# Patient Record
Sex: FEMALE | Race: WHITE | HISPANIC OR LATINO | Employment: UNEMPLOYED | URBAN - METROPOLITAN AREA
[De-identification: names, ages, dates, MRNs, and addresses within clinical notes are randomized per-mention and may not be internally consistent; named-entity substitution may affect disease eponyms.]

---

## 2019-11-27 ENCOUNTER — OFFICE VISIT (OUTPATIENT)
Dept: FAMILY MEDICINE | Facility: CLINIC | Age: 29
End: 2019-11-27

## 2019-11-27 VITALS
DIASTOLIC BLOOD PRESSURE: 70 MMHG | BODY MASS INDEX: 30.09 KG/M2 | SYSTOLIC BLOOD PRESSURE: 110 MMHG | HEIGHT: 63 IN | RESPIRATION RATE: 16 BRPM | OXYGEN SATURATION: 98 % | TEMPERATURE: 98.3 F | HEART RATE: 63 BPM | WEIGHT: 169.8 LBS

## 2019-11-27 DIAGNOSIS — F19.21 DRUG ADDICTION IN REMISSION (H): ICD-10-CM

## 2019-11-27 DIAGNOSIS — R07.0 THROAT PAIN: Primary | ICD-10-CM

## 2019-11-27 DIAGNOSIS — K13.0 MUCOCELE OF LIP: ICD-10-CM

## 2019-11-27 DIAGNOSIS — J06.9 UPPER RESPIRATORY TRACT INFECTION, UNSPECIFIED TYPE: ICD-10-CM

## 2019-11-27 DIAGNOSIS — Z71.89 ACP (ADVANCE CARE PLANNING): ICD-10-CM

## 2019-11-27 DIAGNOSIS — Z76.89 HEALTH CARE HOME: ICD-10-CM

## 2019-11-27 LAB — S PYO AG THROAT QL IA.RAPID: NORMAL

## 2019-11-27 PROCEDURE — 87070 CULTURE OTHR SPECIMN AEROBIC: CPT | Performed by: FAMILY MEDICINE

## 2019-11-27 PROCEDURE — 87880 STREP A ASSAY W/OPTIC: CPT | Performed by: FAMILY MEDICINE

## 2019-11-27 PROCEDURE — 99204 OFFICE O/P NEW MOD 45 MIN: CPT | Performed by: FAMILY MEDICINE

## 2019-11-27 SDOH — HEALTH STABILITY: MENTAL HEALTH: HOW OFTEN DO YOU HAVE A DRINK CONTAINING ALCOHOL?: NEVER

## 2019-11-27 ASSESSMENT — MIFFLIN-ST. JEOR: SCORE: 1475.37

## 2019-11-27 NOTE — LETTER
[facility]  [address] [place]  [address]  [facility] [address]  [phone]      November 27, 2019      Cody Pelaez  [address]  [facility] [address]      EMERGENCY CARE PLAN  Presenting Problem Treatment Plan   Questions or concerns during clinic hours I will call the clinic directly:    [facility]  [address] , [address]  [place] [address]  [phone]   Questions or concerns outside clinic hours  I will call the 24 hour line at [phone]   Patient needs to schedule an appointment  I will call the  scheduling line at [phone]   Same day treatment   I will call the clinic first, then  urgent care and/or  express care if needed   Clinic Care Coordinators Natalya Singh RN:  [phone]  [facility] Clinical Support Staff: [phone]    Crisis Services:  Behavioral or Mental Health P (Behavioral Health Providers)   [phone]   Emergency treatment--Immediately CALL [phone]

## 2019-11-27 NOTE — PATIENT INSTRUCTIONS
Follow handout    Monitor your lip mucocele  If not resolving on its own, ENT consult for removal    Symptomatic care with decongestants, fluids, tylenol/advil prn. Use GUAIFENESIN  MG OR TBCR, 1 tab po BID (Twice per day), D: 20, R: 0 for congestion and cough.    In addition, I have suggested that the patient   monitor for symptoms of bacterial infection expecting slow gradual resolution of viral URI as the natural course.

## 2019-11-27 NOTE — PROGRESS NOTES
SUBJECTIVE: 28 year old female complaining of sore throat for 3 day(s).   The patient describes nasal congestion as well. SO has a daughter who visited with a cold  The patient denies a history of fever, GI symptoms or rash.   Smoking history: No.   Relevant past medical history: positive for lump on her lip after trauma from new braces/ hardware caught her lip. healing slowly.     ROS: 10 point ROS neg other than the symptoms noted above in the HPI.      OBJECTIVE: The patient appears healthy, alert, no distress, cooperative and smiling.   EARS: negative  NOSE/SINUS: positive findings: mucosa erythematous and swollen, clear rhinorrhea   THROAT: erythematous, post nasal drainage and right upper lip healing mucocele/ braces and lower lip piercing with metal touching this area     RSS negative    NECK:Neck supple. No adenopathy. Thyroid symmetric, normal size,, Carotids without bruits.   CHEST: Regular rate and  rhythm. S1 and S2 normal, no murmurs, clicks, gallops or rubs. No edema or JVD. Chest is clear; no wheezes or rales.  ABD: The abdomen is soft without tenderness, guarding, mass or organomegaly. Bowel sounds are normal. No CVA tenderness or inguinal adenopathy noted.      ASSESSMENT:(R07.0) Throat pain  (primary encounter diagnosis)  Comment: symptomatic care/ await culture  Plan: Rapid strep screen, THROAT CULTURE (BFP)        Symptomatic care with decongestants, fluids, tylenol/advil prn. Use GUAIFENESIN  MG OR TBCR, 1 tab po BID (Twice per day), D: 20, R: 0 for congestion and cough.    In addition, I have suggested that the patient   monitor for symptoms of bacterial infection expecting slow gradual resolution of viral URI as the natural course.      (J06.9) Upper respiratory tract infection, unspecified type  Plan: as above    (K13.0) Mucocele of lip  Comment: remove piercing that touches this area  Plan: OTOLARYNGOLOGY REFERRAL        Friction and trauma/ can heal. Protect. Schedule ENT for any  concerns    (F19.21) Drug addiction in remission (H)  Plan: Support    (Z76.89) Health Care Home  Plan: I have reviewed the patient's medical history in detail and updated the computerized patient record.      (Z71.89) ACP (advance care planning)  Plan: I have reviewed the patient's medical history in detail and updated the computerized patient record.

## 2019-11-27 NOTE — NURSING NOTE
Cody is here for sore throat    Pre-visit Screening:  Immunizations:  up to date  Colonoscopy:  NA  Mammogram: NA  Asthma Action Test/Plan:  NA  PHQ9:  None  GAD7:  None  Questioned patient about current smoking habits Pt. recently quit smoking.  Ok to leave detailed message on voice mail for today's visit only Yes, phone #[phone]

## 2019-11-29 LAB — THROAT CULTURE: NORMAL

## 2020-01-20 ENCOUNTER — OFFICE VISIT (OUTPATIENT)
Dept: FAMILY MEDICINE | Facility: CLINIC | Age: 30
End: 2020-01-20

## 2020-01-20 VITALS
DIASTOLIC BLOOD PRESSURE: 62 MMHG | SYSTOLIC BLOOD PRESSURE: 106 MMHG | HEART RATE: 64 BPM | WEIGHT: 177.2 LBS | TEMPERATURE: 98.2 F | HEIGHT: 64 IN | RESPIRATION RATE: 20 BRPM | BODY MASS INDEX: 30.25 KG/M2

## 2020-01-20 DIAGNOSIS — Z13.220 SCREENING CHOLESTEROL LEVEL: ICD-10-CM

## 2020-01-20 DIAGNOSIS — Z00.01 ENCOUNTER FOR GENERAL ADULT MEDICAL EXAMINATION WITH ABNORMAL FINDINGS: Primary | ICD-10-CM

## 2020-01-20 DIAGNOSIS — K59.09 OTHER CONSTIPATION: ICD-10-CM

## 2020-01-20 DIAGNOSIS — Z83.49 FAMILY HISTORY OF THYROID DISORDER: ICD-10-CM

## 2020-01-20 DIAGNOSIS — F19.21 DRUG ADDICTION IN REMISSION (H): ICD-10-CM

## 2020-01-20 DIAGNOSIS — F31.30 BIPOLAR I DISORDER, MOST RECENT EPISODE DEPRESSED (H): ICD-10-CM

## 2020-01-20 DIAGNOSIS — Z13.1 SCREENING FOR DIABETES MELLITUS: ICD-10-CM

## 2020-01-20 LAB
CHOLEST SERPL-MCNC: 182 MG/DL (ref 0–199)
CHOLEST/HDLC SERPL: 3 {RATIO} (ref 0–5)
GLUCOSE SERPL-MCNC: 90 MG/DL (ref 60–99)
HDLC SERPL-MCNC: 60 MG/DL (ref 40–150)
HEMOGLOBIN: 12.4 G/DL (ref 11.7–15.7)
LDLC SERPL CALC-MCNC: 110 MG/DL (ref 0–130)
TRIGL SERPL-MCNC: 59 MG/DL (ref 0–149)

## 2020-01-20 PROCEDURE — 80061 LIPID PANEL: CPT | Performed by: FAMILY MEDICINE

## 2020-01-20 PROCEDURE — 90471 IMMUNIZATION ADMIN: CPT | Performed by: FAMILY MEDICINE

## 2020-01-20 PROCEDURE — 90715 TDAP VACCINE 7 YRS/> IM: CPT | Performed by: FAMILY MEDICINE

## 2020-01-20 PROCEDURE — 36415 COLL VENOUS BLD VENIPUNCTURE: CPT | Performed by: FAMILY MEDICINE

## 2020-01-20 PROCEDURE — 99395 PREV VISIT EST AGE 18-39: CPT | Mod: 25 | Performed by: FAMILY MEDICINE

## 2020-01-20 PROCEDURE — 82947 ASSAY GLUCOSE BLOOD QUANT: CPT | Performed by: FAMILY MEDICINE

## 2020-01-20 PROCEDURE — 85018 HEMOGLOBIN: CPT | Performed by: FAMILY MEDICINE

## 2020-01-20 SDOH — ECONOMIC STABILITY: INCOME INSECURITY: HOW HARD IS IT FOR YOU TO PAY FOR THE VERY BASICS LIKE FOOD, HOUSING, MEDICAL CARE, AND HEATING?: SOMEWHAT HARD

## 2020-01-20 SDOH — ECONOMIC STABILITY: FOOD INSECURITY: WITHIN THE PAST 12 MONTHS, YOU WORRIED THAT YOUR FOOD WOULD RUN OUT BEFORE YOU GOT MONEY TO BUY MORE.: NEVER TRUE

## 2020-01-20 SDOH — ECONOMIC STABILITY: FOOD INSECURITY: WITHIN THE PAST 12 MONTHS, THE FOOD YOU BOUGHT JUST DIDN'T LAST AND YOU DIDN'T HAVE MONEY TO GET MORE.: NEVER TRUE

## 2020-01-20 SDOH — ECONOMIC STABILITY: TRANSPORTATION INSECURITY
IN THE PAST 12 MONTHS, HAS THE LACK OF TRANSPORTATION KEPT YOU FROM MEDICAL APPOINTMENTS OR FROM GETTING MEDICATIONS?: NO

## 2020-01-20 SDOH — ECONOMIC STABILITY: TRANSPORTATION INSECURITY
IN THE PAST 12 MONTHS, HAS LACK OF TRANSPORTATION KEPT YOU FROM MEETINGS, WORK, OR FROM GETTING THINGS NEEDED FOR DAILY LIVING?: NO

## 2020-01-20 ASSESSMENT — MIFFLIN-ST. JEOR: SCORE: 1515.83

## 2020-01-20 NOTE — LETTER
January 21, 2020      Cody Pelaez  [address]  [place] [address]        Dear ,    We are writing to inform you of your test results.    Your test results fall within the expected range(s) including thyroid hormone level (TSH).   Please continue with current treatment plan.    Resulted Orders   CL AFF HEMOGLOBIN (BFP)   Result Value Ref Range    Hemoglobin 12.4 11.7 - 15.7 g/dL   Glucose Fasting (BFP)   Result Value Ref Range    Glucose 90 60 - 99 mg/dL   TSH with free T4 reflex ([facility])   Result Value Ref Range    TSH 1.51 mIU/L      Comment:                Reference Range                         > or = 20 Years  0.40-4.50                              Pregnancy Ranges            First trimester    0.26-2.66            Second trimester   0.55-2.73            Third trimester    0.43-2.91     Lipid Panel (BFP)   Result Value Ref Range    Cholesterol 182 0 - 199 mg/dL    Triglycerides 59 0 - 149 mg/dL    HDL Cholesterol 60 40 - 150 mg/dL    LDL Cholesterol Direct 110 0 - 130 mg/dL    Cholesterol/HDL Ratio 3 0 - 5       If you have any questions or concerns, please call the clinic at the number listed above.       Sincerely,        Jess Carmichael MD

## 2020-01-20 NOTE — PATIENT INSTRUCTIONS
Await labs    Exercise encouraged  Fasting lipid profile obtained  Follow up in 1 year  Glucose obtained  Hemoglobin obtained  High fiber, low fat diet encouraged  Routine breast self-exam encouraged  Seat belt use encouraged  Sunscreen recommended  TSH obtained  Weight loss recommended    Read thyroid handout

## 2020-01-20 NOTE — NURSING NOTE
Cody Pelaez is here for a CPX.    Pre-visit planning  Immunizations -up to date  Colonoscopy -  Mammogram -  Asthma test --  PHQ9 -  LOUISE 7 -    Questioned patient about current smoking habits.  Pt. quit smoking some time ago.  Body mass index is 30.9 kg/m .  PULSE regular  My Chart:   CLASSIFICATION OF OVERWEIGHT AND OBESITY BY BMI                        Obesity Class           BMI(kg/m2)  Underweight                                    < 18.5  Normal                                         18.5-24.9  Overweight                                     25.0-29.9  OBESITY                     I                  30.0-34.9                             II                 35.0-39.9  EXTREME OBESITY             III                >40                            Patient's  BMI Body mass index is 30.9 kg/m .  Http://hin.nhlbi.nih.gov/menuplanner/menu.cgi

## 2020-01-21 LAB — TSH SERPL-ACNC: 1.51 MIU/L

## 2022-05-29 NOTE — PROGRESS NOTES
"SUBJECTIVE:  Cody Pelaez is an 29 year old G 3 P 0 woman who presents for   annual gyn exam. No LMP recorded. Periods are irregular every 2-4 months lasting   3 days. Dysmenorrhea:none. Cyclic symptoms   include none. No intermenstrual bleeding,   spotting, or discharge.    Current contraception: vasectomy  ALETHEA exposure: no  History of abnormal Pap smear: No  Family history of uterine or ovarian cancer: No  Regular self breast exam: No  History of abnormal mammogram: No  Family history of breast cancer: No  History of abnormal lipids: No    Past Medical History:   Diagnosis Date     Bipolar I disorder, most recent episode depressed (H) 1/20/2020     Drug addiction in remission (H) 11/27/2019     Mucocele of lip 11/27/2019     Other constipation 1/20/2020       Family History   Problem Relation Age of Onset     Hyperlipidemia Mother      Thyroid Disease Mother      Diabetes Type 2  Maternal Grandmother        Past Surgical History:   Procedure Laterality Date     NO HISTORY OF SURGERY         No current outpatient medications on file.     No current facility-administered medications for this visit.      No Known Allergies    Social History     Tobacco Use     Smoking status: Former Smoker     Packs/day: 1.00     Years: 2.00     Pack years: 2.00     Smokeless tobacco: Never Used   Substance Use Topics     Alcohol use: Never     Frequency: Never       Review Of Systems  Ears/Nose/Throat: negative/ see last visit and ENT consultation  Respiratory: No shortness of breath, dyspnea on exertion, cough, or hemoptysis  Cardiovascular: negative  Gastrointestinal: hemorrhoids /constipation  Genitourinary: negative  Mental health: admits to bipolar illness seeing a provider in [place]/ drug addiction issues    OBJECTIVE:  /62 (BP Location: Left arm, Patient Position: Chair, Cuff Size: Adult Regular)   Pulse 64   Temp 98.2  F (36.8  C) (Oral)   Resp 20   Ht 1.613 m (5' 3.5\")   Wt 80.4 kg (177 lb 3.2 oz)   LMP " 12/12/2019   Breastfeeding No   BMI 30.90 kg/m    General appearance: healthy, alert, no distress, cooperative and fatigued  Skin: Skin color, texture, turgor normal. No rashes or lesions.  Ears: negative  Nose/Sinuses: Nares normal. Septum midline. Mucosa normal. No drainage or sinus tenderness.  Oropharynx: Lips, mucosa, and tongue normal. Teeth and gums normal.  Neck: Neck supple. No adenopathy. Thyroid symmetric, normal size,, Carotids without bruits.  Lungs: negative, Percussion normal. Good diaphragmatic excursion. Lungs clear  Heart: negative, PMI normal. No lifts, heaves, or thrills. RRR. No murmurs, clicks gallops or rub  Breasts: Inspection negative. No nipple discharge or bleeding. No masses.  Abdomen: Abdomen soft, non-tender. BS normal. No masses, organomegaly  Pelvic: External genitalia and vagina normal. Bimanual and rectovaginal normal.  BMI : Body mass index is 30.9 kg/m .    ASSESSMENT:  (Z00.01) Encounter for general adult medical examination with abnormal findings  (primary encounter diagnosis)  Plan: CL AFF HEMOGLOBIN (BFP), VENOUS COLLECTION        Await labs    Exercise encouraged  Fasting lipid profile obtained  Follow up in 1 year  Glucose obtained  Hemoglobin obtained  High fiber, low fat diet encouraged  Routine breast self-exam encouraged  Seat belt use encouraged  Sunscreen recommended  TSH obtained  Weight loss recommended      (F31.30) Bipolar I disorder, most recent episode depressed (H)  Plan: TSH with free T4 reflex ([facility]), VENOUS         COLLECTION        I have reviewed the patient's medical history in detail and updated the computerized patient record.  Support    (F19.21) Drug addiction in remission (H)  Plan: I have reviewed the patient's medical history in detail and updated the computerized patient record.      (Z83.49) Family history of thyroid disorder  Plan: TSH with free T4 reflex ([facility]), VENOUS         COLLECTION            (K59.09) Other constipation  Plan: TSH with  free T4 reflex ([facility]), VENOUS         COLLECTION        High fiber reviewed    (Z13.1) Screening for diabetes mellitus  Plan: Glucose Fasting (BFP), VENOUS COLLECTION            (Z13.220) Screening cholesterol level  Plan: Lipid Panel (BFP), VENOUS COLLECTION              Dx:  1)  Pap smear  2)  Mammography, lipids at appropriate intervals    PE:  Reviewed health maintenance including diet, regular exercise   and periodic exams.   Bilateral hydronephrosis

## 2022-08-10 ENCOUNTER — OFFICE VISIT (OUTPATIENT)
Dept: FAMILY MEDICINE | Facility: CLINIC | Age: 32
End: 2022-08-10
Payer: COMMERCIAL

## 2022-08-10 ENCOUNTER — TELEPHONE (OUTPATIENT)
Dept: FAMILY MEDICINE | Facility: CLINIC | Age: 32
End: 2022-08-10

## 2022-08-10 VITALS
SYSTOLIC BLOOD PRESSURE: 104 MMHG | HEART RATE: 66 BPM | OXYGEN SATURATION: 99 % | HEIGHT: 64 IN | RESPIRATION RATE: 20 BRPM | WEIGHT: 169.3 LBS | DIASTOLIC BLOOD PRESSURE: 71 MMHG | BODY MASS INDEX: 28.9 KG/M2 | TEMPERATURE: 97.3 F

## 2022-08-10 DIAGNOSIS — F31.30 BIPOLAR I DISORDER, MOST RECENT EPISODE DEPRESSED (H): ICD-10-CM

## 2022-08-10 DIAGNOSIS — Z13.1 SCREENING FOR DIABETES MELLITUS: ICD-10-CM

## 2022-08-10 DIAGNOSIS — Z12.4 CERVICAL CANCER SCREENING: ICD-10-CM

## 2022-08-10 DIAGNOSIS — E66.3 OVERWEIGHT (BMI 25.0-29.9): ICD-10-CM

## 2022-08-10 DIAGNOSIS — Z11.59 NEED FOR HEPATITIS C SCREENING TEST: ICD-10-CM

## 2022-08-10 DIAGNOSIS — F19.21 DRUG ADDICTION IN REMISSION (H): ICD-10-CM

## 2022-08-10 DIAGNOSIS — Z00.00 ROUTINE GENERAL MEDICAL EXAMINATION AT A HEALTH CARE FACILITY: Primary | ICD-10-CM

## 2022-08-10 DIAGNOSIS — Z01.83 BLOOD TYPING ENCOUNTER: Primary | ICD-10-CM

## 2022-08-10 DIAGNOSIS — Z83.49 FAMILY HISTORY OF THYROID DISEASE: ICD-10-CM

## 2022-08-10 DIAGNOSIS — H93.12 TINNITUS, LEFT: ICD-10-CM

## 2022-08-10 DIAGNOSIS — Z78.9 HEPATITIS B VACCINATION STATUS UNKNOWN: ICD-10-CM

## 2022-08-10 DIAGNOSIS — Z11.4 SCREENING FOR HIV (HUMAN IMMUNODEFICIENCY VIRUS): ICD-10-CM

## 2022-08-10 PROBLEM — K59.09 OTHER CONSTIPATION: Status: RESOLVED | Noted: 2020-01-20 | Resolved: 2022-08-10

## 2022-08-10 PROBLEM — Z71.89 ACP (ADVANCE CARE PLANNING): Status: RESOLVED | Noted: 2019-11-27 | Resolved: 2022-08-10

## 2022-08-10 PROBLEM — Z76.89 HEALTH CARE HOME: Status: RESOLVED | Noted: 2019-11-27 | Resolved: 2022-08-10

## 2022-08-10 PROBLEM — K13.0 MUCOCELE OF LIP: Status: RESOLVED | Noted: 2019-11-27 | Resolved: 2022-08-10

## 2022-08-10 PROCEDURE — 87340 HEPATITIS B SURFACE AG IA: CPT | Performed by: PHYSICIAN ASSISTANT

## 2022-08-10 PROCEDURE — G0145 SCR C/V CYTO,THINLAYER,RESCR: HCPCS | Performed by: PHYSICIAN ASSISTANT

## 2022-08-10 PROCEDURE — 99395 PREV VISIT EST AGE 18-39: CPT | Performed by: PHYSICIAN ASSISTANT

## 2022-08-10 PROCEDURE — 86704 HEP B CORE ANTIBODY TOTAL: CPT | Performed by: PHYSICIAN ASSISTANT

## 2022-08-10 PROCEDURE — 86706 HEP B SURFACE ANTIBODY: CPT | Performed by: PHYSICIAN ASSISTANT

## 2022-08-10 PROCEDURE — 36415 COLL VENOUS BLD VENIPUNCTURE: CPT | Performed by: PHYSICIAN ASSISTANT

## 2022-08-10 PROCEDURE — 86803 HEPATITIS C AB TEST: CPT | Performed by: PHYSICIAN ASSISTANT

## 2022-08-10 PROCEDURE — 87389 HIV-1 AG W/HIV-1&-2 AB AG IA: CPT | Performed by: PHYSICIAN ASSISTANT

## 2022-08-10 PROCEDURE — 80061 LIPID PANEL: CPT | Performed by: PHYSICIAN ASSISTANT

## 2022-08-10 PROCEDURE — 80048 BASIC METABOLIC PNL TOTAL CA: CPT | Performed by: PHYSICIAN ASSISTANT

## 2022-08-10 PROCEDURE — 84443 ASSAY THYROID STIM HORMONE: CPT | Performed by: PHYSICIAN ASSISTANT

## 2022-08-10 RX ORDER — TRAZODONE HYDROCHLORIDE 50 MG/1
TABLET, FILM COATED ORAL
COMMUNITY
Start: 2022-07-18

## 2022-08-10 ASSESSMENT — ENCOUNTER SYMPTOMS
ARTHRALGIAS: 1
WEAKNESS: 0
CHILLS: 0
HEADACHES: 1
NAUSEA: 0
HEMATURIA: 0
EYE PAIN: 0
DIARRHEA: 0
JOINT SWELLING: 0
HEARTBURN: 0
ABDOMINAL PAIN: 0
NERVOUS/ANXIOUS: 1
MYALGIAS: 0
DYSURIA: 0
HEMATOCHEZIA: 0
SHORTNESS OF BREATH: 0
PALPITATIONS: 0
PARESTHESIAS: 0
COUGH: 0
FEVER: 0
SORE THROAT: 0
CONSTIPATION: 0
FREQUENCY: 0
DIZZINESS: 0

## 2022-08-10 ASSESSMENT — PATIENT HEALTH QUESTIONNAIRE - PHQ9
SUM OF ALL RESPONSES TO PHQ QUESTIONS 1-9: 13
SUM OF ALL RESPONSES TO PHQ QUESTIONS 1-9: 13
10. IF YOU CHECKED OFF ANY PROBLEMS, HOW DIFFICULT HAVE THESE PROBLEMS MADE IT FOR YOU TO DO YOUR WORK, TAKE CARE OF THINGS AT HOME, OR GET ALONG WITH OTHER PEOPLE: NOT DIFFICULT AT ALL

## 2022-08-10 NOTE — PROGRESS NOTES
"   SUBJECTIVE:   CC: Cody Pelaez is an 31 year old woman who presents for preventive health visit.     Patient has been advised of split billing requirements and indicates understanding: Yes  Healthy Habits:     Getting at least 3 servings of Calcium per day:  Yes    Bi-annual eye exam:  NO    Dental care twice a year:  Yes    Sleep apnea or symptoms of sleep apnea:  Excessive snoring    Diet:  Regular (no restrictions)    Frequency of exercise:  2-3 days/week    Duration of exercise:  N/A    Taking medications regularly:  Yes    Medication side effects:  None    PHQ-2 Total Score: 4    Additional concerns today:  No    History of bipolar I disorder - follows with psychiatry. Has been on medication in the past but not currently treated with meds. Sees therapist weekly. Takes trazodone for sleep.     Lives with her boyfriend and his 12 year old son. He has 15 year old and 18 year old daughters who live with their mom. She is on disability due to mental health.    Has noticed ringing and a \"whooshing\" sound intermittently in her left ear. No ear pain, hearing changes.    She grew up in [place] - not sure if she had hepatitis B vaccine.    Today's PHQ-2 Score:   PHQ-2 ( 1999 Pfizer) 8/10/2022   Q1: Little interest or pleasure in doing things 2   Q2: Feeling down, depressed or hopeless 2   PHQ-2 Score 4   PHQ-2 Total Score (12-17 Years)- Positive if 3 or more points; Administer PHQ-A if positive -   Q1: Little interest or pleasure in doing things More than half the days   Q2: Feeling down, depressed or hopeless More than half the days   PHQ-2 Score 4   Answers for HPI/ROS submitted by the patient on 8/10/2022  If you checked off any problems, how difficult have these problems made it for you to do your work, take care of things at home, or get along with other people?: Not difficult at all  PHQ9 TOTAL SCORE: 13    Abuse: Current or Past (Physical, Sexual or Emotional) - No  Do you feel safe in your " environment? Yes    Social History     Tobacco Use     Smoking status: Former Smoker     Packs/day: 1.00     Years: 2.00     Pack years: 2.00     Smokeless tobacco: Never Used   Substance Use Topics     Alcohol use: Never     Alcohol Use 8/10/2022   Prescreen: >3 drinks/day or >7 drinks/week? No     Reviewed orders with patient.  Reviewed health maintenance and updated orders accordingly - No  Lab work is in process  Labs reviewed in EPIC  BP Readings from Last 3 Encounters:   08/10/22 104/71   01/20/20 106/62   11/27/19 110/70    Wt Readings from Last 3 Encounters:   08/10/22 76.8 kg (169 lb 4.8 oz)   01/20/20 80.4 kg (177 lb 3.2 oz)   11/27/19 77 kg (169 lb 12.8 oz)                  Patient Active Problem List   Diagnosis     Drug addiction in remission (H)     Bipolar I disorder, most recent episode depressed (H)     Overweight (BMI 25.0-29.9)     Past Surgical History:   Procedure Laterality Date     NO HISTORY OF SURGERY         Social History     Tobacco Use     Smoking status: Former Smoker     Packs/day: 1.00     Years: 2.00     Pack years: 2.00     Smokeless tobacco: Never Used   Substance Use Topics     Alcohol use: Never     Family History   Problem Relation Age of Onset     Hyperlipidemia Mother      Thyroid Disease Mother      Diabetes Type 2  Mother      Diabetes Type 2  Maternal Grandmother      Bipolar Disorder Sister      Bipolar Disorder Maternal Aunt          Current Outpatient Medications   Medication Sig Dispense Refill     traZODone (DESYREL) 50 MG tablet        No Known Allergies  Recent Labs   Lab Test 01/20/20  0944 01/20/20  0000   LDL  --  110   HDL  --  60   TRIG  --  59   TSH 1.51  --         Breast Cancer Screening:    Breast CA Risk Assessment (FHS-7) 8/10/2022   Do you have a family history of breast, colon, or ovarian cancer? No / Unknown     Patient under 40 years of age: Routine Mammogram Screening not recommended.   Pertinent mammograms are reviewed under the imaging  "tab.    History of abnormal Pap smear: NO - age 21-29 PAP every 3 years recommended     Reviewed and updated as needed this visit by clinical staff   Tobacco  Allergies  Meds  Problems  Med Hx  Surg Hx  Fam Hx  Soc   Hx        Reviewed and updated as needed this visit by Provider   Tobacco  Allergies  Meds  Problems  Med Hx  Surg Hx  Fam Hx           Past Medical History:   Diagnosis Date     Bipolar I disorder, most recent episode depressed (H) 2020     Drug addiction in remission (H) 2019     Mucocele of lip 2019     Other constipation 2020      Past Surgical History:   Procedure Laterality Date     NO HISTORY OF SURGERY       OB History    Para Term  AB Living   3 0 0 0 3 0   SAB IAB Ectopic Multiple Live Births   0 0 0 0 0      # Outcome Date GA Lbr Gen/2nd Weight Sex Delivery Anes PTL Lv   3 AB            2 AB            1 AB                Review of Systems   Constitutional: Negative for chills and fever.   HENT: Positive for ear pain and hearing loss. Negative for congestion and sore throat.    Eyes: Negative for pain and visual disturbance.   Respiratory: Negative for cough and shortness of breath.    Cardiovascular: Negative for chest pain, palpitations and peripheral edema.   Gastrointestinal: Negative for abdominal pain, constipation, diarrhea, heartburn, hematochezia and nausea.   Genitourinary: Negative for dysuria, frequency, genital sores, hematuria and urgency.   Musculoskeletal: Positive for arthralgias. Negative for joint swelling and myalgias.   Skin: Negative for rash.   Neurological: Positive for headaches. Negative for dizziness, weakness and paresthesias.   Psychiatric/Behavioral: Positive for mood changes. The patient is nervous/anxious.      OBJECTIVE:   /71 (BP Location: Right arm, Patient Position: Sitting, Cuff Size: Adult Regular)   Pulse 66   Temp 97.3  F (36.3  C) (Oral)   Resp 20   Ht 1.626 m (5' 4\")   Wt 76.8 kg (169 lb 4.8 " oz)   LMP 07/25/2022 (Within Days)   SpO2 99%   BMI 29.06 kg/m    Physical Exam  GENERAL: healthy, alert and no distress  EYES: Eyes grossly normal to inspection, PERRL and conjunctivae and sclerae normal  HENT: ear canals and TM's normal, nose and mouth without ulcers or lesions  NECK: no adenopathy, no asymmetry, masses, or scars and thyroid normal to palpation  RESP: lungs clear to auscultation - no rales, rhonchi or wheezes  BREAST: normal without masses, tenderness or nipple discharge and no palpable axillary masses or adenopathy  CV: regular rate and rhythm, normal S1 S2, no S3 or S4, no murmur, click or rub, no peripheral edema and peripheral pulses strong  ABDOMEN: soft, nontender, no hepatosplenomegaly, no masses and bowel sounds normal   (female): normal female external genitalia, normal urethral meatus, vaginal mucosa pink, moist, well rugated, and normal cervix/adnexa/uterus without masses or discharge  MS: no gross musculoskeletal defects noted, no edema  SKIN: no suspicious lesions or rashes  NEURO: Normal strength and tone, mentation intact and speech normal  PSYCH: mentation appears normal, affect normal/bright    Diagnostic Test Results:  Labs reviewed in Epic    ASSESSMENT/PLAN:   Routine general medical examination at a health care facility  Reviewed personal and family history. Reviewed age appropriate screenings. Reviewed healthy BP and BMI ranges. Counseled on lifestyle modifications for optimal mental and physical health.  Discussed age-appropriate health maintenance. Recommended any needed vaccinations. Continue to focus on well balanced diet and exercise. Fasting today and would like lipids checked. Declines COVID booster today.  - Lipid panel reflex to direct LDL Fasting; Future  - Lipid panel reflex to direct LDL Fasting    Screening for HIV (human immunodeficiency virus)  Discussed, agrees  - HIV Antigen Antibody Combo; Future  - HIV Antigen Antibody Combo    Need for hepatitis C  "screening test  Discussed, agrees  - Hepatitis C Screen Reflex to HCV RNA Quant and Genotype; Future  - Hepatitis C Screen Reflex to HCV RNA Quant and Genotype    Cervical cancer screening  - Pap Screen reflex to HPV if ASCUS - recommend age 25 - 29    Family history of thyroid disease  Mom has history of thyroid disease. She would like to check her thyroid level.  - TSH with free T4 reflex; Future  - TSH with free T4 reflex    Screening for diabetes mellitus  - Basic metabolic panel  (Ca, Cl, CO2, Creat, Gluc, K, Na, BUN); Future  - Basic metabolic panel  (Ca, Cl, CO2, Creat, Gluc, K, Na, BUN)    Hepatitis B vaccination status unknown  - Hepatitis B surface antigen; Future  - Hepatitis B Surface Antibody; Future  - Hepatitis B core antibody; Future  - Hepatitis B surface antigen  - Hepatitis B Surface Antibody  - Hepatitis B core antibody    Tinnitus, left  Follow-up with ENT.  - Adult ENT  Referral; Future    Bipolar I disorder, most recent episode depressed (H)  Chronic, reports stable. Following with psych.    Drug addiction in remission (H)  Sober since 2016    Overweight (BMI 25.0-29.9)      Patient has been advised of split billing requirements and indicates understanding: Yes    COUNSELING:  Reviewed preventive health counseling, as reflected in patient instructions    Estimated body mass index is 29.06 kg/m  as calculated from the following:    Height as of this encounter: 1.626 m (5' 4\").    Weight as of this encounter: 76.8 kg (169 lb 4.8 oz).    Weight management plan: Discussed healthy diet and exercise guidelines    She reports that she has quit smoking. She has a 2.00 pack-year smoking history. She has never used smokeless tobacco.      Counseling Resources:  ATP IV Guidelines  Pooled Cohorts Equation Calculator  Breast Cancer Risk Calculator  BRCA-Related Cancer Risk Assessment: FHS-7 Tool  FRAX Risk Assessment  ICSI Preventive Guidelines  Dietary Guidelines for Americans, 2010  USDA's " MyPlate  ASA Prophylaxis  Lung CA Screening    RAMAKRISHNA [facility]

## 2022-08-10 NOTE — LETTER
"August 16, 2022      Cody Pelaez  [address]  [facility] [address]        Dear Cody,     It was a pleasure to see you in clinic! Here are your recent labs.     Total cholesterol is a little elevated, please continue exercise and watch diet. Triglycerides are normal, this is simple sugar and fat in blood. HDL which is the \"good\" cholesterol (heart protective) is normal, increase this with more exercise. The LDL or \"bad\" cholesterol is a little elevated but does not require medication at this time.     Your metabolic panel reveals normal kidney function and electrolytes. Your glucose is normal.     Your thyroid test is normal.     Your hepatitis C and HIV screening tests are negative.     Your hepatitis B tests indicate that you are not immune to hepatitis B and I would recommend getting the hepatitis B vaccine. You can schedule a nurse visit for this at the clinic.     If you have any questions or concerns, please do not hesitate to contact me.     Take care,   Stephenie Lomax PA-C       Resulted Orders   HIV Antigen Antibody Combo   Result Value Ref Range    HIV Antigen Antibody Combo Nonreactive Nonreactive      Comment:      HIV-1 p24 Ag & HIV-1/HIV-2 Ab Not Detected   Hepatitis C Screen Reflex to HCV RNA Quant and Genotype   Result Value Ref Range    Hepatitis C Antibody Nonreactive Nonreactive    Narrative    Assay performance characteristics have not been established for newborns, infants, and children.   Hepatitis B surface antigen   Result Value Ref Range    Hepatitis B Surface Antigen Nonreactive Nonreactive   Hepatitis B Surface Antibody   Result Value Ref Range    Hepatitis B Surface Antibody 0.00 <8.00 m[IU]/mL      Comment:      Nonreactive, No antibody detected when the value is less than 8.00 mIU/mL.   Hepatitis B core antibody   Result Value Ref Range    Hepatitis B Core Antibody Total Nonreactive Nonreactive   TSH with free T4 reflex   Result Value Ref Range    TSH 1.50 0.40 - 4.00 mU/L "   Basic metabolic panel  (Ca, Cl, CO2, Creat, Gluc, K, Na, BUN)   Result Value Ref Range    Sodium 140 133 - 144 mmol/L    Potassium 3.8 3.4 - 5.3 mmol/L    Chloride 105 94 - 109 mmol/L    Carbon Dioxide (CO2) 27 20 - 32 mmol/L    Anion Gap 8 3 - 14 mmol/L    Urea Nitrogen 10 7 - 30 mg/dL    Creatinine 0.63 0.52 - 1.04 mg/dL    Calcium 8.8 8.5 - 10.1 mg/dL    Glucose 79 70 - 99 mg/dL    GFR Estimate >90 >60 mL/min/1.73m2      Comment:      Effective December 21, 2021 eGFRcr in adults is calculated using the 2021 CKD-EPI creatinine equation which includes age and gender (Gaetano et al., NE, DOI: 10.1056/VEBBnl9195388)   Lipid panel reflex to direct LDL Fasting   Result Value Ref Range    Cholesterol 201 (H) <200 mg/dL    Triglycerides 75 <150 mg/dL    Direct Measure HDL 55 >=50 mg/dL    LDL Cholesterol Calculated 131 (H) <=100 mg/dL    Non HDL Cholesterol 146 (H) <130 mg/dL    Patient Fasting > 8hrs? Yes     Narrative    Cholesterol  Desirable:  <200 mg/dL    Triglycerides  Normal:  Less than 150 mg/dL  Borderline High:  150-199 mg/dL  High:  200-499 mg/dL  Very High:  Greater than or equal to 500 mg/dL    Direct Measure HDL  Female:  Greater than or equal to 50 mg/dL   Male:  Greater than or equal to 40 mg/dL    LDL Cholesterol  Desirable:  <100mg/dL  Above Desirable:  100-129 mg/dL   Borderline High:  130-159 mg/dL   High:  160-189 mg/dL   Very High:  >= 190 mg/dL    Non HDL Cholesterol  Desirable:  130 mg/dL  Above Desirable:  130-159 mg/dL  Borderline High:  160-189 mg/dL  High:  190-219 mg/dL  Very High:  Greater than or equal to 220 mg/dL       If you have any questions or concerns, please call the clinic at the number listed above.

## 2022-08-11 LAB
ANION GAP SERPL CALCULATED.3IONS-SCNC: 8 MMOL/L (ref 3–14)
BUN SERPL-MCNC: 10 MG/DL (ref 7–30)
CALCIUM SERPL-MCNC: 8.8 MG/DL (ref 8.5–10.1)
CHLORIDE BLD-SCNC: 105 MMOL/L (ref 94–109)
CHOLEST SERPL-MCNC: 201 MG/DL
CO2 SERPL-SCNC: 27 MMOL/L (ref 20–32)
CREAT SERPL-MCNC: 0.63 MG/DL (ref 0.52–1.04)
FASTING STATUS PATIENT QL REPORTED: YES
GFR SERPL CREATININE-BSD FRML MDRD: >90 ML/MIN/1.73M2
GLUCOSE BLD-MCNC: 79 MG/DL (ref 70–99)
HBV CORE AB SERPL QL IA: NONREACTIVE
HBV SURFACE AB SERPL IA-ACNC: 0 M[IU]/ML
HBV SURFACE AG SERPL QL IA: NONREACTIVE
HCV AB SERPL QL IA: NONREACTIVE
HDLC SERPL-MCNC: 55 MG/DL
HIV 1+2 AB+HIV1 P24 AG SERPL QL IA: NONREACTIVE
LDLC SERPL CALC-MCNC: 131 MG/DL
NONHDLC SERPL-MCNC: 146 MG/DL
POTASSIUM BLD-SCNC: 3.8 MMOL/L (ref 3.4–5.3)
SODIUM SERPL-SCNC: 140 MMOL/L (ref 133–144)
TRIGL SERPL-MCNC: 75 MG/DL
TSH SERPL DL<=0.005 MIU/L-ACNC: 1.5 MU/L (ref 0.4–4)

## 2022-08-11 NOTE — TELEPHONE ENCOUNTER
Please call. I ordered a blood type lab for her and she will need to schedule a lab visit for this. She should check with her insurance regarding coverage/cost.    Stephenie Lomax PA-C

## 2022-08-11 NOTE — TELEPHONE ENCOUNTER
Spoke with Cody to let her know to contact insurance and than once she gets confirmation of coverage she can schedule lab unless she wants to pay out of pocket.    Brenda Medina

## 2022-08-12 LAB
BKR LAB AP GYN ADEQUACY: NORMAL
BKR LAB AP GYN INTERPRETATION: NORMAL
BKR LAB AP HPV REFLEX: NORMAL
BKR LAB AP PREVIOUS ABNORMAL: NORMAL
PATH REPORT.COMMENTS IMP SPEC: NORMAL
PATH REPORT.COMMENTS IMP SPEC: NORMAL
PATH REPORT.RELEVANT HX SPEC: NORMAL

## 2022-08-16 NOTE — TELEPHONE ENCOUNTER
FUTURE VISIT INFORMATION      FUTURE VISIT INFORMATION:    Date: 11/11/2022    Time: 10:30 AM       Location[facility]   REFERRAL INFORMATION:    Referring provider:  Stephenie Lomax PA-C    Referring providers clinic: [facility]     Reason for visit/diagnosis  Tinnitus- left     RECORDS REQUESTED FROM:       Clinic name Comments Records Status Imaging Status    Cl[facility] [place]  8/10/2022 Office visit with RAMAKRISHNA Garcia

## 2022-09-06 ENCOUNTER — ALLIED HEALTH/NURSE VISIT (OUTPATIENT)
Dept: FAMILY MEDICINE | Facility: CLINIC | Age: 32
End: 2022-09-06
Payer: COMMERCIAL

## 2022-09-06 DIAGNOSIS — Z23 NEED FOR PROPHYLACTIC VACCINATION AND INOCULATION AGAINST INFLUENZA: ICD-10-CM

## 2022-09-06 DIAGNOSIS — Z23 ENCOUNTER FOR IMMUNIZATION: Primary | ICD-10-CM

## 2022-09-06 PROCEDURE — 90746 HEPB VACCINE 3 DOSE ADULT IM: CPT

## 2022-09-06 PROCEDURE — 99207 PR NO CHARGE NURSE ONLY: CPT

## 2022-09-06 PROCEDURE — 90686 IIV4 VACC NO PRSV 0.5 ML IM: CPT

## 2022-09-06 PROCEDURE — 90472 IMMUNIZATION ADMIN EACH ADD: CPT

## 2022-09-06 PROCEDURE — 90471 IMMUNIZATION ADMIN: CPT

## 2022-10-11 ENCOUNTER — ALLIED HEALTH/NURSE VISIT (OUTPATIENT)
Dept: FAMILY MEDICINE | Facility: CLINIC | Age: 32
End: 2022-10-11
Payer: COMMERCIAL

## 2022-10-11 DIAGNOSIS — Z23 NEED FOR HEPATITIS B BOOSTER VACCINATION: Primary | ICD-10-CM

## 2022-10-11 PROCEDURE — 90471 IMMUNIZATION ADMIN: CPT

## 2022-10-11 PROCEDURE — 90746 HEPB VACCINE 3 DOSE ADULT IM: CPT

## 2022-10-11 PROCEDURE — 99207 PR NO CHARGE NURSE ONLY: CPT

## 2022-10-17 ENCOUNTER — TELEPHONE (OUTPATIENT)
Dept: FAMILY MEDICINE | Facility: CLINIC | Age: 32
End: 2022-10-17

## 2022-10-28 ENCOUNTER — DOCUMENTATION ONLY (OUTPATIENT)
Dept: OTOLARYNGOLOGY | Facility: CLINIC | Age: 32
End: 2022-10-28

## 2022-10-28 DIAGNOSIS — Z01.10 ENCOUNTER FOR HEARING TEST: Primary | ICD-10-CM

## 2022-11-11 ENCOUNTER — PRE VISIT (OUTPATIENT)
Dept: OTOLARYNGOLOGY | Facility: CLINIC | Age: 32
End: 2022-11-11

## 2022-11-11 ENCOUNTER — OFFICE VISIT (OUTPATIENT)
Dept: OTOLARYNGOLOGY | Facility: CLINIC | Age: 32
End: 2022-11-11
Payer: COMMERCIAL

## 2022-11-11 ENCOUNTER — OFFICE VISIT (OUTPATIENT)
Dept: AUDIOLOGY | Facility: CLINIC | Age: 32
End: 2022-11-11
Payer: COMMERCIAL

## 2022-11-11 VITALS
SYSTOLIC BLOOD PRESSURE: 118 MMHG | BODY MASS INDEX: 29.19 KG/M2 | DIASTOLIC BLOOD PRESSURE: 69 MMHG | HEART RATE: 52 BPM | WEIGHT: 171 LBS | HEIGHT: 64 IN

## 2022-11-11 DIAGNOSIS — H61.23 BILATERAL IMPACTED CERUMEN: Primary | ICD-10-CM

## 2022-11-11 DIAGNOSIS — H93.12 TINNITUS OF LEFT EAR: Primary | ICD-10-CM

## 2022-11-11 DIAGNOSIS — H90.3 SENSORINEURAL HEARING LOSS (SNHL) OF BOTH EARS: ICD-10-CM

## 2022-11-11 DIAGNOSIS — H93.12 TINNITUS, LEFT: ICD-10-CM

## 2022-11-11 PROCEDURE — 92550 TYMPANOMETRY & REFLEX THRESH: CPT | Performed by: AUDIOLOGIST

## 2022-11-11 PROCEDURE — 99203 OFFICE O/P NEW LOW 30 MIN: CPT | Mod: 25 | Performed by: OTOLARYNGOLOGY

## 2022-11-11 PROCEDURE — 92557 COMPREHENSIVE HEARING TEST: CPT | Performed by: AUDIOLOGIST

## 2022-11-11 PROCEDURE — 69210 REMOVE IMPACTED EAR WAX UNI: CPT | Performed by: OTOLARYNGOLOGY

## 2022-11-11 ASSESSMENT — PAIN SCALES - GENERAL: PAINLEVEL: NO PAIN (0)

## 2022-11-11 NOTE — PATIENT INSTRUCTIONS
1. You were seen in the ENT Clinic today by Dr. Mccauley.  If you have any questions or concerns after your appointment, please call   - Option 1: ENT Clinic: [phone]   - Option 2: [facility] (Dr. Mccauley's Nurse): [phone]       Ame (Dr. Mccauley's Nurse): [phone]    2.   Plan to return to clinic in 1 year with hearing test    3. MRI temporal bone/ IAC- will call with results    4. Tinnitus Support/Mental Health- Dilma Martinez [phone]- call to schedule appt    5. Hearing aid consult in audiology    6. Alcohol/Vinegar rinses  Alcohol/Vinegar Ear Irrigations      Items needed:  + Rubber ear or baby bulb syringe (2-3 oz size) or syringe given in clinic  +1 pint of Isopropyl  Alcohol (70%)  +Distilled (white) Vinegar    Instructions:    Remove 2 TBSP alcohol from pint bottle and discard. ADD 2 TBSP of the distilled (white) Vinegar to the pint bottle.  Fill clean cup 1/2 full of the mixture and heat to room temperature. Place large towel under the ear. Irrigate by squeezing syringe to form a steady stream into ear.     Irrigate as needed     How to Contact Us:  Send a [email or web address] message to your provider. Our team will respond to you via [email or web address]. Occasionally, we will need to call you to get further information.  For urgent matters (Monday-Friday), call the ENT Clinic: [phone] and speak with a call center team member - they will route your call appropriately.   If you'd like to speak directly with a nurse, please find our contact information below. We do our best to check voicemail frequently throughout the day, and will work to call you back within 1-2 days. For urgent matters, please use the general clinic phone numbers listed above.      The patient presents with a history of tinnitus that is unilateral in the left ear. Her ears are cleaned of cerumen impaction bilaterally. She will be referred for an MRI scan of the head to assess her unilateral tinnitus. She will use acetic acid/vinegar/alcohol  ear rinses to prevent cerumen impaction. She will be referred to the tinnitus support program and she will be seen yearly for repeat Audiogram and Tympanogram reviews. She will follow up after her MRI scan. She is considering enrolling in the study of tinnitus by Dr. Jazmin Ashton.     Claribel SANTIAGO LPN  [facility] - Otolaryngology

## 2022-11-11 NOTE — NURSING NOTE
"Chief Complaint   Patient presents with     Consult     Tinnitus       Blood pressure 118/69, pulse 52, height 1.626 m (5' 4\"), weight 77.6 kg (171 lb), not currently breastfeeding.    Charleen Conner, EMT    "

## 2022-11-11 NOTE — PROGRESS NOTES
The patient presents with a history of tinnitus in the left ear. This developed prior to the COVID pandemic for no know reason. The patient denies sinusitis, rhinitis, facial pain, nasal obstruction or purulent nasal discharge. The patient denies chronic or recurrent tonsillitis, chronic or recurrent pharyngitis. The patient denies otalgia, otorrhea, eustachian tube dysfunction, ear infections or dizziness.     Her Audiogram and Tympanogram are reviewed with her and they demonstrate mild bilateral symmetric sensorineural hearing loss with 100% word recognition scores bilaterally and As tympanograms bilaterally and normal middle ear pressures.     This patient is seen in consultation at the request of Stephenie Lomax PA-C.    Past Medical History:    Past Medical History:   Diagnosis Date     Bipolar I disorder, most recent episode depressed (H) 1/20/2020     Drug addiction in remission (H) 11/27/2019     Mucocele of lip 11/27/2019     Other constipation 1/20/2020       Past Surgical History:    Past Surgical History:   Procedure Laterality Date     NO HISTORY OF SURGERY         Medications:      Current Outpatient Medications:      traZODone (DESYREL) 50 MG tablet, , Disp: , Rfl:     Allergies:    Patient has no known allergies.    Physical Examination:    The patient is a well developed, well nourished female in no apparent distress.  She is normocephalic, atraumatic with pupils equally round and reactive to light.    Oral Cavity Examination:  Normal mucosa with no masses or lesions  Nasal Examination: Normal mucosa with no masses or lesions  Ear Examination: Ear canals are impacted with cerumen. The ear canals are cleaned of cerumen using an alligator forceps, a currette and a suction using a binocular microscope for visualization. The tympanic membranes and middle ear spaces normal  Neurological Examination: Facial nerve function intact and symmetric  Integumentary Examination: No lesions on the skin of the head  and neck  Neck Examination: No masses or lesions, no lymphadenopathy  Endocrine Examination: Normal thyroid examination    Assessment and Plan:    The patient presents with a history of tinnitus that is unilateral in the left ear. Her ears are cleaned of cerumen impaction bilaterally. She will be referred for an MRI scan of the head to assess her unilateral tinnitus. She will use acetic acid/vinegar/alcohol ear rinses to prevent cerumen impaction. She will be referred to the tinnitus support program and she will be seen yearly for repeat Audiogram and Tympanogram reviews. She will follow up after her MRI scan. She is considering enrolling in the study of tinnitus by Dr. Jazmin Ashton.     CC: Stephenie Lomax PA-C

## 2022-11-11 NOTE — LETTER
11/11/2022       RE: Cody Pelaez  [address]  [facility] [address]     Dear Colleague,    Thank you for referring your patient, Cody Pelaez, to the [facility] [place] at [facility]. Please see a copy of my visit note below.    The patient presents with a history of tinnitus in the left ear. This developed prior to the COVID pandemic for no know reason. The patient denies sinusitis, rhinitis, facial pain, nasal obstruction or purulent nasal discharge. The patient denies chronic or recurrent tonsillitis, chronic or recurrent pharyngitis. The patient denies otalgia, otorrhea, eustachian tube dysfunction, ear infections or dizziness.     Her Audiogram and Tympanogram are reviewed with her and they demonstrate mild bilateral symmetric sensorineural hearing loss with 100% word recognition scores bilaterally and As tympanograms bilaterally and normal middle ear pressures.     This patient is seen in consultation at the request of Stpehenie Lomax PA-C.    Past Medical History:    Past Medical History:   Diagnosis Date     Bipolar I disorder, most recent episode depressed (H) 1/20/2020     Drug addiction in remission (H) 11/27/2019     Mucocele of lip 11/27/2019     Other constipation 1/20/2020       Past Surgical History:    Past Surgical History:   Procedure Laterality Date     NO HISTORY OF SURGERY         Medications:      Current Outpatient Medications:      traZODone (DESYREL) 50 MG tablet, , Disp: , Rfl:     Allergies:    Patient has no known allergies.    Physical Examination:    The patient is a well developed, well nourished female in no apparent distress.  She is normocephalic, atraumatic with pupils equally round and reactive to light.    Oral Cavity Examination:  Normal mucosa with no masses or lesions  Nasal Examination: Normal mucosa with no masses or lesions  Ear Examination: Ear canals are impacted with  cerumen. The ear canals are cleaned of cerumen using an alligator forceps, a currette and a suction using a binocular microscope for visualization. The tympanic membranes and middle ear spaces normal  Neurological Examination: Facial nerve function intact and symmetric  Integumentary Examination: No lesions on the skin of the head and neck  Neck Examination: No masses or lesions, no lymphadenopathy  Endocrine Examination: Normal thyroid examination    Assessment and Plan:    The patient presents with a history of tinnitus that is unilateral in the left ear. Her ears are cleaned of cerumen impaction bilaterally. She will be referred for an MRI scan of the head to assess her unilateral tinnitus. She will use acetic acid/vinegar/alcohol ear rinses to prevent cerumen impaction. She will be referred to the tinnitus support program and she will be seen yearly for repeat Audiogram and Tympanogram reviews. She will follow up after her MRI scan. She is considering enrolling in the study of tinnitus by Dr. Jazmin Ashton.     CC: Stephenie Lomax PA-C      Again, thank you for allowing me to participate in the care of your patient.      Sincerely,    Fercho Mccauley MD

## 2022-11-11 NOTE — PROGRESS NOTES
AUDIOLOGY REPORT    SUMMARY: Audiology visit completed. See audiogram for results.      RECOMMENDATIONS: Follow-up with ENT.    Kathy Jauregui, [facility]  Licensed Audiologist  [place] License #[id number]

## 2022-12-19 ENCOUNTER — TELEPHONE (OUTPATIENT)
Dept: AUDIOLOGY | Facility: CLINIC | Age: 32
End: 2022-12-19

## 2022-12-28 ENCOUNTER — ANCILLARY PROCEDURE (OUTPATIENT)
Dept: MRI IMAGING | Facility: CLINIC | Age: 32
End: 2022-12-28
Payer: COMMERCIAL

## 2022-12-28 DIAGNOSIS — H93.12 TINNITUS, LEFT: ICD-10-CM

## 2022-12-28 PROCEDURE — 70553 MRI BRAIN STEM W/O & W/DYE: CPT | Mod: GC | Performed by: STUDENT IN AN ORGANIZED HEALTH CARE EDUCATION/TRAINING PROGRAM

## 2022-12-28 PROCEDURE — A9585 GADOBUTROL INJECTION: HCPCS | Performed by: STUDENT IN AN ORGANIZED HEALTH CARE EDUCATION/TRAINING PROGRAM

## 2022-12-28 RX ORDER — GADOBUTROL 604.72 MG/ML
7.5 INJECTION INTRAVENOUS ONCE
Status: COMPLETED | OUTPATIENT
Start: 2022-12-28 | End: 2022-12-28

## 2022-12-28 RX ADMIN — GADOBUTROL 7.5 ML: 604.72 INJECTION INTRAVENOUS at 15:31

## 2022-12-28 NOTE — DISCHARGE INSTRUCTIONS
MRI Contrast Discharge Instructions    The IV contrast you received today will pass out of your body in your  urine. This will happen in the next 24 hours. You will not feel this process.  Your urine will not change color.    Drink at least 4 extra glasses of water or juice today (unless your doctor  has restricted your fluids). This reduces the stress on your kidneys.  You may take your regular medicines.    If you are on dialysis: It is best to have dialysis today.    If you have a reaction: Most reactions happen right away. If you have  any new symptoms after leaving the hospital (such as hives or swelling),  call your hospital at the correct number below. Or call your family doctor.  If you have breathing distress or wheezing, call 911.    Special instructions: ***    I have read and understand the above information.    Signature:______________________________________ Date:___________    Staff:__________________________________________ Date:___________     Time:__________    [facility] Departments:    ___[place]: [phone]  ___[facility]: [phone]  ___[place]: [phone] ___[facility]: [phone]  ___[facility]: [phone]  ___[facility]: [phone]  ___[phone]  ___[facility]: [phone]  ___[phone]

## 2023-03-13 ENCOUNTER — OFFICE VISIT (OUTPATIENT)
Dept: AUDIOLOGY | Facility: CLINIC | Age: 33
End: 2023-03-13
Payer: COMMERCIAL

## 2023-03-13 DIAGNOSIS — H90.42 SENSORINEURAL HEARING LOSS (SNHL) OF LEFT EAR WITH UNRESTRICTED HEARING OF RIGHT EAR: Primary | ICD-10-CM

## 2023-03-13 DIAGNOSIS — H93.12 TINNITUS OF LEFT EAR: ICD-10-CM

## 2023-03-13 PROCEDURE — 92590 PR HEARING AID EXAM MONAURAL: CPT | Mod: LT | Performed by: AUDIOLOGIST

## 2023-03-13 NOTE — PROGRESS NOTES
AUDIOLOGY REPORT    SUBJECTIVE: Cody Pelaez is a 32 year old female who was seen in the Audiology Clinic at  [facility] on 3/13/2023 to discuss concerns with hearing and functional communication difficulties. The patient has been seen previously on 11/11/2022, and results revealed normal hearing for the right ear and normal hearing to mild sensorineural hearing loss for the left ear. The patient was medically evaluated and determined to be cleared for a left hearing aid by Fercho Lamb M.D.     OBJECTIVE: Cody is not currently meeting her insurance's criteria for a hearing aid in the left ear (puretone average at 1000, 2000, and 3000 Hz of 23 dB HL); however, she notes bothersome left tinnitus and difficulty with communication in a variety of listening environments. She is interested in submitting a prior authorization to her insurance.       If approved, the hearing aid mutually chosen was:  LEFT: ReSound Omnia 7 - R  COLOR: 71  BATTERY SIZE: Rechargeable  EARMOLD/TIPS: Medium open dome  CANAL/ LENGTH: 1(M&JOSEPH)    ASSESSMENT: Reviewed purchase information and warranty information with the patient. The 45 day trial period was explained to the patient. The patient was given a copy of the [place] Department of Health consumer brochure on purchasing hearing instruments. Patient risk factors have been provided to the patient in writing prior to the sale of the hearing aid per FDA regulation. The risk factors are also available in the User Instructional Booklet to be presented on the day of the hearing aid fitting.     PLAN: A prior authorization will be submitted to the patient's insurance for a left hearing aid. If approved, Cody will return for a hearing aid fitting and programming. Purchase agreement will be completed at that time. Please contact this clinic with any questions or concerns.      Glenda Ferrer, [facility]  Licensed  Audiologist  [place] #[id number]

## 2023-04-03 ENCOUNTER — DOCUMENTATION ONLY (OUTPATIENT)
Dept: AUDIOLOGY | Facility: CLINIC | Age: 33
End: 2023-04-03
Payer: COMMERCIAL

## 2023-04-03 NOTE — PROGRESS NOTES
LVM with Prior Auth Department at [facility] requesting update on status of prior auth submitted for left hearing aid on 3/13/2023.      Glenda Ferrer, [facility]  Licensed Audiologist  [place] #[id number]

## 2023-04-05 ENCOUNTER — TELEPHONE (OUTPATIENT)
Dept: AUDIOLOGY | Facility: CLINIC | Age: 33
End: 2023-04-05
Payer: COMMERCIAL

## 2023-04-11 ENCOUNTER — ALLIED HEALTH/NURSE VISIT (OUTPATIENT)
Dept: FAMILY MEDICINE | Facility: CLINIC | Age: 33
End: 2023-04-11
Payer: COMMERCIAL

## 2023-04-11 DIAGNOSIS — Z23 NEED FOR HEPATITIS B BOOSTER VACCINATION: Primary | ICD-10-CM

## 2023-04-11 PROCEDURE — 99207 PR NO CHARGE NURSE ONLY: CPT

## 2023-04-11 PROCEDURE — 90746 HEPB VACCINE 3 DOSE ADULT IM: CPT

## 2023-04-11 PROCEDURE — 90471 IMMUNIZATION ADMIN: CPT

## 2023-04-11 NOTE — PROGRESS NOTES
Prior to immunization administration, verified patients identity using patient s name and date of birth. Please see Immunization Activity for additional information.     Screening Questionnaire for Adult Immunization    Are you sick today?   No   Do you have allergies to medications, food, a vaccine component or latex?   No   Have you ever had a serious reaction after receiving a vaccination?   No   Do you have a long-term health problem with heart, lung, kidney, or metabolic disease (e.g., diabetes), asthma, a blood disorder, no spleen, complement component deficiency, a cochlear implant, or a spinal fluid leak?  Are you on long-term aspirin therapy?   No   Do you have cancer, leukemia, HIV/AIDS, or any other immune system problem?   No   Do you have a parent, brother, or sister with an immune system problem?   No   In the past 3 months, have you taken medications that affect  your immune system, such as prednisone, other steroids, or anticancer drugs; drugs for the treatment of rheumatoid arthritis, Crohn s disease, or psoriasis; or have you had radiation treatments?   No   Have you had a seizure, or a brain or other nervous system problem?   No   During the past year, have you received a transfusion of blood or blood    products, or been given immune (gamma) globulin or antiviral drug?   No   For women: Are you pregnant or is there a chance you could become       pregnant during the next month?   No   Have you received any vaccinations in the past 4 weeks?   No     Immunization questionnaire answers were all negative.    I have reviewed the following standing orders:   This patient is due and qualifies for the Hepatitis B vaccine.    Click here for Hepatitis B Standing Order    I have reviewed the vaccines inclusion and exclusion criteria; No concerns regarding eligibility.         Injection of Hep B given by Analy Velasquez. Patient instructed to remain in clinic for 15 minutes afterwards, and to report any  adverse reactions.     Screening performed by Analy Velasquez on 4/11/2023 at 4:42 PM.

## 2023-05-01 ENCOUNTER — OFFICE VISIT (OUTPATIENT)
Dept: AUDIOLOGY | Facility: CLINIC | Age: 33
End: 2023-05-01
Payer: COMMERCIAL

## 2023-05-01 DIAGNOSIS — H90.42 SENSORINEURAL HEARING LOSS (SNHL) OF LEFT EAR WITH UNRESTRICTED HEARING OF RIGHT EAR: Primary | ICD-10-CM

## 2023-05-01 PROCEDURE — 99207 PR ASSESSMENT FOR HEARING AID: CPT | Performed by: AUDIOLOGIST

## 2023-05-01 NOTE — PROGRESS NOTES
AUDIOLOGY REPORT    SUBJECTIVE: Cody Pelaez is a 32 year old female who was seen in the Audiology Clinic at [facility] on 5/1/2023. Previous results have revealed normal hearing for the right ear and normal hearing to mild sensorineural hearing loss for the left ear. The patient was medically evaluated and determined to be cleared for a left hearing aid by Fercho Mccauley. M.DBran Ross is not currently meeting her insurance's criteria for a hearing aid in the left ear (puretone average at 1000, 2000, and 3000 Hz of 23 dB HL); however, she has bothersome left tinnitus and difficulty with communication in a variety of listening environments. A prior authorization was submitted to her insurance. It was determined that her insurance will not make a final decision on if the left hearing aid will be covered until they see the claim. Explained to the patient via telephone on 4/5/2023 that there was a chance her insurance may not cover the left hearing aid, and she would be responsible for the cost. At that time, she stated she wanted to think about still ordering the left hearing aid and potentially paying out-of-pocket versus having her boyfriend's old hearing aid re-programmed for her. Let her know that she needed to contact the clinic by 4/17/2023 if she wanted to order the left hearing aid to ensure it arrived in time for the appointment today. The patient never contacted the clinic regarding ordering the left hearing aid.     She arrives today and states she thought she was getting the left hearing aid. Reviewed the same information from the telephone encounter with her on 4/5/2023. After discussion, she decided she would like her boyfriend's old hearing aid re-programmed for her. Unfortunately, she did not bring this with her today.          OBJECTIVE: Confirmed that her boyfriend's old hearing aid is a -in-the-canal style that could be  re-programmed for the patient. Explained that she would be billed the codes for fitting of the hearing aid for her. She expressed understanding.     ASSESSMENT: No charge visit, as no billable procedures were performed.     PLAN: Cody will be scheduled to return with her boyfriend's old hearing aid to be re-programmed for her. Please call this clinic with any questions regarding today s appointment.      Glenda Ferrer, [facility]  Licensed Audiologist  [place] #[id number]

## 2023-05-25 NOTE — TELEPHONE ENCOUNTER
Pt calling     She is asking what is her blood type , RN advised this is not a common blood draw, its expensive to draw without medical need to do so. Pt wants to ask provider if it can be ordered as she rides motorcycles and everyone she knows has it on their jackets when they ride.     Routing to provider     Best # [phone] ok LM     Please advise     Thank you     Miriam Tian RN, BSN  [facility]       Discontinue Regimen: Pt has discontinued Clobetasol Detail Level: Zone Render In Strict Bullet Format?: No

## 2023-07-11 ENCOUNTER — PATIENT OUTREACH (OUTPATIENT)
Dept: CARE COORDINATION | Facility: CLINIC | Age: 33
End: 2023-07-11
Payer: COMMERCIAL

## 2023-07-25 ENCOUNTER — PATIENT OUTREACH (OUTPATIENT)
Dept: CARE COORDINATION | Facility: CLINIC | Age: 33
End: 2023-07-25

## 2023-07-25 ENCOUNTER — OFFICE VISIT (OUTPATIENT)
Dept: AUDIOLOGY | Facility: CLINIC | Age: 33
End: 2023-07-25
Payer: COMMERCIAL

## 2023-07-25 DIAGNOSIS — H90.42 SENSORINEURAL HEARING LOSS (SNHL) OF LEFT EAR WITH UNRESTRICTED HEARING OF RIGHT EAR: Primary | ICD-10-CM

## 2023-07-25 PROCEDURE — V5020 CONFORMITY EVALUATION: HCPCS | Performed by: AUDIOLOGIST

## 2023-07-25 PROCEDURE — 92592 PR HEARING AID CHECK, MONAURAL: CPT | Mod: LT | Performed by: AUDIOLOGIST

## 2023-07-25 PROCEDURE — V5011 HEARING AID FITTING/CHECKING: HCPCS | Performed by: AUDIOLOGIST

## 2023-07-26 NOTE — PROGRESS NOTES
AUDIOLOGY REPORT    SUBJECTIVE: Cody Pelaez is a 32 year old female who was seen in the Audiology Clinic at the [facility] for a fitting of a left hearing aid that was her boyfriend's device. Previous results have revealed a normal hearing for the right ear and normal hearing to mild sensorineural hearing loss for the left ear. The patient was medically evaluated and determined to be cleared for a left hearing aid by Fercho Lamb M.D. The patient's hearing loss did not meet insurance criteria for a hearing aid, so she elected to be fit with her boyfriend's device instead.   OBJECTIVE: [facility] Advance Recipient Notice of Non-covered Service/Item was reviewed and signed. Patient expressed understanding that following appointments would also not be covered. The hearing aid conformity evaluation was completed.The hearing aids were placed and they provided a good fit. Real-ear-probe-microphone measurements were completed on the Store Eyes system and were a good match to NAL-NL1 target with soft sounds audible, moderate sounds comfortable, and loud sounds below discomfort. UCLs are verified through maximum power output measures and demonstrate appropriate limiting of loud inputs. Cody was oriented to proper hearing aid use, care, cleaning (no water, dry brush), batteries (size: 312, insertion/removal, toxicity, low-battery signal), aid insertion/removal, user booklet, warranty information, storage cases, and other hearing aid details. The patient confirmed understanding of hearing aid use and care, and showed proper insertion of hearing aid and batteries while in the office today. Cody reported good volume and sound quality today.   Hearing aids were programmed as follows:  Program 1:All Around     EAR(S) FIT: Left  HEARING AID MODEL NAME: ReSound Linx2 961  HEARING AID STYLE: -in-the-ear behind-the-ear  EARMOLDS/TIP: Medium open dome  SERIAL NUMBERS:  Right: N/A Left: [id number]  WARRANTY END DATE: N/A    Patient did not wish to connect the hearing aid to her phone at this time. Reviewed that this may be an option if she would like to use it.   ASSESSMENT: A left ReSound Linx2 961 hearing aid was fit today. Verification measures were performed. Cody signed the Hearing Aid Purchase Agreement and was given a copy, as well as details on her hearing aids. Patient was counseled that exact out of pocket amounts cannot be determined for hearing aid claims being sent to insurance. Any insurance coverage information presented to the patient is an estimate only, and is not a guarantee of payment. Patient has been advised to check with their own insurance.    PLAN: Cody will return for follow-up in 2-3 weeks for a hearing aid review appointment. Please call this clinic with questions regarding today s appointment.    Kathy Arrington. CCC-A  Licensed Audiologist   [place] #[id number]

## 2023-10-03 ENCOUNTER — OFFICE VISIT (OUTPATIENT)
Dept: FAMILY MEDICINE | Facility: CLINIC | Age: 33
End: 2023-10-03
Payer: COMMERCIAL

## 2023-10-03 VITALS
WEIGHT: 173.1 LBS | RESPIRATION RATE: 12 BRPM | OXYGEN SATURATION: 99 % | HEART RATE: 70 BPM | TEMPERATURE: 98 F | BODY MASS INDEX: 30.67 KG/M2 | SYSTOLIC BLOOD PRESSURE: 100 MMHG | HEIGHT: 63 IN | DIASTOLIC BLOOD PRESSURE: 64 MMHG

## 2023-10-03 DIAGNOSIS — Z00.00 ROUTINE GENERAL MEDICAL EXAMINATION AT A HEALTH CARE FACILITY: Primary | ICD-10-CM

## 2023-10-03 DIAGNOSIS — E66.3 OVERWEIGHT (BMI 25.0-29.9): ICD-10-CM

## 2023-10-03 DIAGNOSIS — F31.30 BIPOLAR I DISORDER, MOST RECENT EPISODE DEPRESSED (H): ICD-10-CM

## 2023-10-03 DIAGNOSIS — H02.824 CYST OF LEFT UPPER EYELID: ICD-10-CM

## 2023-10-03 LAB
ALBUMIN SERPL BCG-MCNC: 4.2 G/DL (ref 3.5–5.2)
ALP SERPL-CCNC: 49 U/L (ref 35–104)
ALT SERPL W P-5'-P-CCNC: 19 U/L (ref 0–50)
ANION GAP SERPL CALCULATED.3IONS-SCNC: 9 MMOL/L (ref 7–15)
AST SERPL W P-5'-P-CCNC: 24 U/L (ref 0–45)
BILIRUB SERPL-MCNC: 0.5 MG/DL
BUN SERPL-MCNC: 10.1 MG/DL (ref 6–20)
CALCIUM SERPL-MCNC: 9.3 MG/DL (ref 8.6–10)
CHLORIDE SERPL-SCNC: 104 MMOL/L (ref 98–107)
CHOLEST SERPL-MCNC: 198 MG/DL
CREAT SERPL-MCNC: 0.63 MG/DL (ref 0.51–0.95)
DEPRECATED HCO3 PLAS-SCNC: 27 MMOL/L (ref 22–29)
EGFRCR SERPLBLD CKD-EPI 2021: >90 ML/MIN/1.73M2
ERYTHROCYTE [DISTWIDTH] IN BLOOD BY AUTOMATED COUNT: 12.9 % (ref 10–15)
GLUCOSE SERPL-MCNC: 85 MG/DL (ref 70–99)
HCT VFR BLD AUTO: 35.8 % (ref 35–47)
HDLC SERPL-MCNC: 50 MG/DL
HGB BLD-MCNC: 11.7 G/DL (ref 11.7–15.7)
LDLC SERPL CALC-MCNC: 138 MG/DL
MCH RBC QN AUTO: 28.6 PG (ref 26.5–33)
MCHC RBC AUTO-ENTMCNC: 32.7 G/DL (ref 31.5–36.5)
MCV RBC AUTO: 88 FL (ref 78–100)
NONHDLC SERPL-MCNC: 148 MG/DL
PLATELET # BLD AUTO: 246 10E3/UL (ref 150–450)
POTASSIUM SERPL-SCNC: 3.8 MMOL/L (ref 3.4–5.3)
PROT SERPL-MCNC: 7.5 G/DL (ref 6.4–8.3)
RBC # BLD AUTO: 4.09 10E6/UL (ref 3.8–5.2)
SODIUM SERPL-SCNC: 140 MMOL/L (ref 135–145)
TRIGL SERPL-MCNC: 50 MG/DL
WBC # BLD AUTO: 4.8 10E3/UL (ref 4–11)

## 2023-10-03 PROCEDURE — 90471 IMMUNIZATION ADMIN: CPT | Performed by: GENERAL PRACTICE

## 2023-10-03 PROCEDURE — 85027 COMPLETE CBC AUTOMATED: CPT | Performed by: GENERAL PRACTICE

## 2023-10-03 PROCEDURE — 90472 IMMUNIZATION ADMIN EACH ADD: CPT | Performed by: GENERAL PRACTICE

## 2023-10-03 PROCEDURE — 36415 COLL VENOUS BLD VENIPUNCTURE: CPT | Performed by: GENERAL PRACTICE

## 2023-10-03 PROCEDURE — 90686 IIV4 VACC NO PRSV 0.5 ML IM: CPT | Performed by: GENERAL PRACTICE

## 2023-10-03 PROCEDURE — 99395 PREV VISIT EST AGE 18-39: CPT | Mod: 25 | Performed by: GENERAL PRACTICE

## 2023-10-03 PROCEDURE — 80053 COMPREHEN METABOLIC PANEL: CPT | Performed by: GENERAL PRACTICE

## 2023-10-03 PROCEDURE — 80061 LIPID PANEL: CPT | Performed by: GENERAL PRACTICE

## 2023-10-03 PROCEDURE — 99213 OFFICE O/P EST LOW 20 MIN: CPT | Mod: 25 | Performed by: GENERAL PRACTICE

## 2023-10-03 PROCEDURE — 90632 HEPA VACCINE ADULT IM: CPT | Performed by: GENERAL PRACTICE

## 2023-10-03 ASSESSMENT — ENCOUNTER SYMPTOMS
DIARRHEA: 0
PARESTHESIAS: 0
SHORTNESS OF BREATH: 0
ABDOMINAL PAIN: 0
FEVER: 0
WEAKNESS: 0
BREAST MASS: 0
HEARTBURN: 0
HEMATOCHEZIA: 0
NAUSEA: 0
NERVOUS/ANXIOUS: 1
HEMATURIA: 0
JOINT SWELLING: 0
COUGH: 0
CHILLS: 0
DYSURIA: 0
ARTHRALGIAS: 0
HEADACHES: 0
MYALGIAS: 0
SORE THROAT: 0
PALPITATIONS: 0
EYE PAIN: 1
DIZZINESS: 0
CONSTIPATION: 0
FREQUENCY: 0

## 2023-10-03 ASSESSMENT — PAIN SCALES - GENERAL: PAINLEVEL: SEVERE PAIN (7)

## 2023-10-03 ASSESSMENT — PATIENT HEALTH QUESTIONNAIRE - PHQ9
SUM OF ALL RESPONSES TO PHQ QUESTIONS 1-9: 2
10. IF YOU CHECKED OFF ANY PROBLEMS, HOW DIFFICULT HAVE THESE PROBLEMS MADE IT FOR YOU TO DO YOUR WORK, TAKE CARE OF THINGS AT HOME, OR GET ALONG WITH OTHER PEOPLE: NOT DIFFICULT AT ALL
SUM OF ALL RESPONSES TO PHQ QUESTIONS 1-9: 2

## 2023-10-03 NOTE — PATIENT INSTRUCTIONS
"Preventive Health Recommendations  Female Ages 26 - 39  Yearly exam:   See your health care provider every year in order to  Review health changes.   Discuss preventive care.    Review your medicines if you your doctor has prescribed any.    Until age 30: Get a Pap test every three years (more often if you have had an abnormal result).    After age 30: Talk to your doctor about whether you should have a Pap test every 3 years or have a Pap test with HPV screening every 5 years.   You do not need a Pap test if your uterus was removed (hysterectomy) and you have not had cancer.  You should be tested each year for STDs (sexually transmitted diseases), if you're at risk.   Talk to your provider about how often to have your cholesterol checked.  If you are at risk for diabetes, you should have a diabetes test (fasting glucose).  Shots: Get a flu shot each year. Get a tetanus shot every 10 years.   Nutrition: Patient Education   Learning About Vaping  What is vaping?     Vaping is using a device to inhale vapor that may contain nicotine, flavorings, or chemicals from marijuana. The devices may also be called electronic cigarettes or e-cigarettes. They may look like pens or flash drives.  How do vapes work?  Vapes, or vaping devices, include a mouthpiece, a battery, a cartridge that holds a liquid or dry material, and a heating element. When you breathe in through the mouthpiece, it turns on the battery and heating element. The heat turns the liquid or dry material into vapor.  What are the safety concerns?  These are some things to consider about vaping.  It can cause a deadly lung injury.  The \"vapor\" made by vaping contains harmful chemicals. There have been cases of lung disease and death related to vaping. Many of these may be from vaping products with THC (a chemical in marijuana) or other additives. The exact cause of lung damage is not known.  Vaping products often have nicotine.  Nicotine is addictive. It can be " "hard to stop using it. Nicotine can be harmful to developing brains, such as in fetuses, children, and young adults. Liquid nicotine can be poisonous if swallowed or spilled on skin. Keep it out of children's reach.  Vaping can expose those around you to secondhand aerosol.  There is a concern about possible health risks from secondhand aerosol exposure.  Vaping devices can catch fire or explode.  Vaping devices can explode. This can cause burns or injuries.  Should you use vaping to stop smoking?  Some people try to quit smoking by reducing the amount of nicotine they vape. They do this slowly over time. If you're thinking of using vaping to help you stop smoking, talk to your doctor first. Here are some other things to think about.  E-cigarettes may not be safe.  Some people have had serious lung problems from vaping. And the long-term effects aren't known.  It's not approved as a quit-smoking aid.  Counseling, medicines, and nicotine replacement products such as patches or gums are preferred.  Some people end up using both.  This is called dual use. Some people choose to vape when they can't smoke. But they may still smoke cigarettes.  Where can you learn more?  Go to https://www.Bizdom.net/patiented  Enter C351 in the search box to learn more about \"Learning About Vaping.\"  Current as of: March 22, 2023               Content Version: 13.7    5636-2705 [employer].   Care instructions adapted under license by your healthcare professional. If you have questions about a medical condition or this instruction, always ask your healthcare professional. [employer] disclaims any warranty or liability for your use of this information.          and vegetables each day.  Eat whole-grain bread, whole-wheat pasta and brown rice instead of white grains and rice.  Get adequate Calcium and Vitamin D.     Lifestyle  Exercise at least 150 minutes a week (30 minutes a day, 5 days of the week). This " will help you control your weight and prevent disease.  Limit alcohol to one drink per day.  No smoking.   Wear sunscreen to prevent skin cancer.  See your dentist every six months for an exam and cleaning.

## 2023-10-03 NOTE — PROGRESS NOTES
SUBJECTIVE:   CC: Cody is an 32 year old who presents for preventive health visit.       10/3/2023     8:48 AM   Additional Questions   Roomed by Lisa Magill, CMA   Accompanied by self         10/3/2023     8:48 AM   Patient Reported Additional Medications   Patient reports taking the following new medications NONE         Wants to lose weight  158 lbs 2019  173 lbs today  Exercising, eating chicken    Go to [place] in Dec.   Wants to lose weight  Vaping      Healthy Habits:     Getting at least 3 servings of Calcium per day:  Yes    Bi-annual eye exam:  NO    Dental care twice a year:  Yes    Sleep apnea or symptoms of sleep apnea:  None    Diet:  Regular (no restrictions)    Frequency of exercise:  4-5 days/week    Duration of exercise:  15-30 minutes    Taking medications regularly:  Yes    Barriers to taking medications:  None    Medication side effects:  None    Additional concerns today:  Yes   Weight concerns    Counseled on vaping.    Today's PHQ-9 Score:       10/3/2023     8:41 AM   PHQ-9 SCORE   PHQ-9 Total Score MyChart 2 (Minimal depression)   PHQ-9 Total Score 2               Social History     Tobacco Use    Smoking status: Former     Packs/day: 1.00     Years: 2.00     Pack years: 2.00     Types: Cigarettes    Smokeless tobacco: Never   Substance Use Topics    Alcohol use: Never             10/3/2023     8:39 AM   Alcohol Use   Prescreen: >3 drinks/day or >7 drinks/week? No          No data to display              Reviewed orders with patient.  Reviewed health maintenance and updated orders accordingly - Yes      Breast Cancer Screenin/10/2022    10:59 AM   Breast CA Risk Assessment (FHS-7)   Do you have a family history of breast, colon, or ovarian cancer? No / Unknown           Pertinent mammograms are reviewed under the imaging tab.    History of abnormal Pap smear: NO - age 30- 65 PAP every 3 years recommended      8/10/2022    11:44 AM   PAP / HPV   PAP Negative for  "Intraepithelial Lesion or Malignancy (NILM)      Reviewed and updated as needed this visit by clinical staff   Tobacco  Allergies  Meds  Problems  Med Hx  Surg Hx  Fam Hx          Reviewed and updated as needed this visit by Provider                     Review of Systems   Constitutional:  Negative for chills and fever.        Sweating in the past couple of months  ?temperature in the house  Denies back pain   HENT:  Negative for congestion, ear pain, hearing loss and sore throat.    Eyes:  Positive for pain. Negative for visual disturbance.        Left  upper eyelid ? Stye  X 5 months  Hard to touch     Respiratory:  Negative for cough and shortness of breath.    Cardiovascular:  Negative for chest pain, palpitations and peripheral edema.   Gastrointestinal:  Negative for abdominal pain, constipation, diarrhea, heartburn, hematochezia and nausea.   Breasts:  Negative for tenderness, breast mass and discharge.   Genitourinary:  Negative for dysuria, frequency, genital sores, hematuria, pelvic pain, urgency, vaginal bleeding and vaginal discharge.   Musculoskeletal:  Negative for arthralgias, joint swelling and myalgias.   Skin:  Negative for rash.   Neurological:  Negative for dizziness, weakness, headaches and paresthesias.   Psychiatric/Behavioral:  Negative for mood changes. The patient is nervous/anxious.    All other systems reviewed and are negative.         OBJECTIVE:   /64 (BP Location: Right arm, Patient Position: Sitting, Cuff Size: Adult Regular)   Pulse 70   Temp 98  F (36.7  C) (Oral)   Resp 12   Ht 1.607 m (5' 3.25\")   Wt 78.5 kg (173 lb 1.6 oz)   LMP 09/18/2023   SpO2 99%   BMI 30.42 kg/m    Physical Exam  Constitutional:       Appearance: Normal appearance. She is obese.   HENT:      Head: Normocephalic and atraumatic.      Nose: Nose normal.   Eyes:      Extraocular Movements: Extraocular movements intact.      Conjunctiva/sclera: Conjunctivae normal.      Pupils: Pupils are " equal, round, and reactive to light.      Comments: Left upper eyelid nodule   Cardiovascular:      Rate and Rhythm: Normal rate and regular rhythm.      Pulses: Normal pulses.      Heart sounds: Normal heart sounds.   Pulmonary:      Effort: Pulmonary effort is normal.      Breath sounds: Normal breath sounds.   Abdominal:      General: Abdomen is flat. Bowel sounds are normal.      Palpations: Abdomen is soft.   Musculoskeletal:         General: Normal range of motion.      Cervical back: Normal range of motion and neck supple.   Skin:     General: Skin is warm.   Neurological:      General: No focal deficit present.      Mental Status: She is alert and oriented to person, place, and time. Mental status is at baseline.   Psychiatric:         Mood and Affect: Mood normal.         ASSESSMENT/PLAN:   (Z00.00) Routine general medical examination at a health care facility  (primary encounter diagnosis)  Comment: Mother with diabetes  Plan: Lipid panel reflex to direct LDL Fasting,         Comprehensive metabolic panel (BMP + Alb, Alk         Phos, ALT, AST, Total. Bili, TP), CBC with         platelets            (F31.30) Bipolar I disorder, most recent episode depressed (H)  Comment: stable  Plan: trazodone    (E66.3) Overweight (BMI 25.0-29.9)  Comment: wants to lose weight, been eating chicken and exercising   Plan: Semaglutide-Weight Management (WEGOVY) 0.25         MG/0.5ML pen        F/U in 4 weeks virtual or E-visit for tolerance    (H02.824) Cyst of left upper eyelid  Comment: duration >5 months, some blocking of her eye  Plan: Adult ENT  Referral          Flu today  HepA vaccine today and second shot in 6-12 months with  nursing  Refuse COVID    Patient has been advised of split billing requirements and indicates understanding: Yes      COUNSELING:  Reviewed preventive health counseling, as reflected in patient instructions              BMI:   Estimated body mass index is 30.42 kg/m  as calculated from  "the following:    Height as of this encounter: 1.607 m (5' 3.25\").    Weight as of this encounter: 78.5 kg (173 lb 1.6 oz).   Weight management plan: Discussed healthy diet and exercise guidelines      She reports that she has quit smoking. Her smoking use included cigarettes. She has a 2.00 pack-year smoking history. She has never used smokeless tobacco.          Aaliyah Joe MD  [facility] ROSEMOUNTAnswers submitted by the patient for this visit:  Patient Health Questionnaire (Submitted on 10/3/2023)  If you checked off any problems, how difficult have these problems made it for you to do your work, take care of things at home, or get along with other people?: Not difficult at all  PHQ9 TOTAL SCORE: 2    "

## 2023-10-03 NOTE — NURSING NOTE
"Chief Complaint   Patient presents with    Physical     Initial /64 (BP Location: Right arm, Patient Position: Sitting, Cuff Size: Adult Regular)   Pulse 70   Temp 98  F (36.7  C) (Oral)   Resp 12   Ht 1.607 m (5' 3.25\")   Wt 78.5 kg (173 lb 1.6 oz)   LMP 09/18/2023   SpO2 99%   BMI 30.42 kg/m   Estimated body mass index is 30.42 kg/m  as calculated from the following:    Height as of this encounter: 1.607 m (5' 3.25\").    Weight as of this encounter: 78.5 kg (173 lb 1.6 oz).  BP completed using cuff size regular long right arm    Lisa Magill, CMA    "

## 2023-10-20 ENCOUNTER — TELEPHONE (OUTPATIENT)
Dept: FAMILY MEDICINE | Facility: CLINIC | Age: 33
End: 2023-10-20
Payer: COMMERCIAL

## 2023-10-20 NOTE — TELEPHONE ENCOUNTER
Pt calls.      She says that [facility] in [place] does not have wegovy 0.25 mg.  Advised she could try to call other local pharmacies to see if they have it in stock and if they do, she could have them transfer it from [facility] in [place].  Advised that it is a little tricky as there is a national shortage.

## 2023-10-25 NOTE — TELEPHONE ENCOUNTER
FUTURE VISIT INFORMATION      FUTURE VISIT INFORMATION:  Date: 12/13/23  Time: 7am  [place] [facility] [place] INFORMATION:  Referring provider:  Aaliyah Joe MD   Referring providers clinic:  [facility]  Reason for visit/diagnosis  H02.824 (ICD-10-CM) - Cyst of left upper eyelid, Referral from Aaliyah Joe, Referral notes in EPIC. Schedule with [facility] per priority line. [facility] location.     RECORDS REQUESTED FROM:       Clinic name Comments Records Status Imaging Status   [facility]  10/3/23- ov [facility]aliyah [facility]  [facility]     Imaging  12/28/22- mr brain  E[facility]  [facility]    [facility]  5/2/23- ED  [facility]

## 2023-11-07 ENCOUNTER — OFFICE VISIT (OUTPATIENT)
Dept: AUDIOLOGY | Facility: CLINIC | Age: 33
End: 2023-11-07
Payer: COMMERCIAL

## 2023-11-07 DIAGNOSIS — H90.42 SENSORINEURAL HEARING LOSS (SNHL) OF LEFT EAR WITH UNRESTRICTED HEARING OF RIGHT EAR: Primary | ICD-10-CM

## 2023-11-07 NOTE — PROGRESS NOTES
AUDIOLOGY REPORT    SUBJECTIVE:Cody Pelaez is a 32 year old female who was seen in the Audiology Clinic at the [facility] on 11/7/2023  for a follow-up check regarding the fitting of new hearing aids. Previous results have revealed a normal hearing for the right ear and normal hearing to mild sensorineural hearing loss for the left ear. The patient has been seen previously in this clinic and was fit with ReSound Linx2 961 on 7/25/23 that was gifted to her by her boyfriend.  Cody reports things are going well with the hearing aid and she is noticing benefit. She would like to connect the devices to her phone if possible. She also notes that she accidentally wore the hearing aid in the shower, but took it off quickly.     OBJECTIVE: Based on patient report, no programming changes were made.    Reviewed 45 day trial period, care, cleaning (no water, dry brush), batteries (size rechargeable) insertion/removal, toxicity, low-battery signal), aid insertion/removal, volume adjustment (if applicable), user booklet, warranty information, storage cases, and other hearing aid details.      Hearing aid was cleaned and checked. Device was deep cleaned and placed in dryer. Microphone ports were vacuumed, wax filters, and domes were changed. Following cleaning, listening check was good. Patient was provided with drying jar as well.     The hearing aids were successfully connected to the patient's iPhone and to the [employer] Smart marti. They were shown how to utilize the bluetooth to stream audio and phone calls and the marti was demonstrated. The patient was given additional resources regarding connectivity and bluetooth support to reference if needed.    Patient had questions regarding billing as she had not received a bill yet. Patient was provided with billing office contact information.      ASSESSMENT: A follow-up appointment for hearing aid fitting was completed today.  Changes to hearing aid was completed as outlined above.     PLAN: Cody will return for follow-up as needed, or at least every 9-12 months for cleaning and assessment of hearing aid.  Please call this clinic with any questions regarding today s appointment.    Kathy Arrington. CCC-A  Licensed Audiologist   [place] #[id number]

## 2023-12-13 ENCOUNTER — OFFICE VISIT (OUTPATIENT)
Dept: OTOLARYNGOLOGY | Facility: CLINIC | Age: 33
End: 2023-12-13
Payer: COMMERCIAL

## 2023-12-13 ENCOUNTER — PRE VISIT (OUTPATIENT)
Dept: OTOLARYNGOLOGY | Facility: CLINIC | Age: 33
End: 2023-12-13

## 2023-12-13 ENCOUNTER — TELEPHONE (OUTPATIENT)
Dept: OTOLARYNGOLOGY | Facility: CLINIC | Age: 33
End: 2023-12-13

## 2023-12-13 VITALS
DIASTOLIC BLOOD PRESSURE: 67 MMHG | SYSTOLIC BLOOD PRESSURE: 108 MMHG | OXYGEN SATURATION: 99 % | HEIGHT: 63 IN | TEMPERATURE: 99.1 F | WEIGHT: 173 LBS | BODY MASS INDEX: 30.65 KG/M2 | HEART RATE: 74 BPM

## 2023-12-13 DIAGNOSIS — H02.824 CYST OF LEFT UPPER EYELID: ICD-10-CM

## 2023-12-13 DIAGNOSIS — H00.015 HORDEOLUM EXTERNUM OF LEFT LOWER EYELID: Primary | ICD-10-CM

## 2023-12-13 PROCEDURE — 99212 OFFICE O/P EST SF 10 MIN: CPT | Performed by: OTOLARYNGOLOGY

## 2023-12-13 ASSESSMENT — PAIN SCALES - GENERAL: PAINLEVEL: NO PAIN (0)

## 2023-12-13 NOTE — LETTER
12/13/2023       RE: Cody Pelaez  [address]  [place] [address]     Dear Colleague,    Thank you for referring your patient, Cody Pelaez, to the [facility] [place] [facility]. Please see a copy of my visit note below.    32 yo Female presents with left lower eyelid stye. Will prescribe tobramycin and refer to ophtho.      Again, thank you for allowing me to participate in the care of your patient.      Sincerely,    Henrique Telles MD

## 2023-12-13 NOTE — TELEPHONE ENCOUNTER
Her ointment was not covered by insurance so I had the pharmacy look for alternatives and nothing else comes up. Pt should reach out to her insurance and see what is covered for Dr Telles to re-prescribe. Pt will call me back.    Claribel Hwang LPN

## 2023-12-13 NOTE — NURSING NOTE
"Chief Complaint   Patient presents with    Consult     Cyst on left  eyelid     Blood pressure 108/67, pulse 74, temperature 99.1  F (37.3  C), height 1.6 m (5' 3\"), weight 78.5 kg (173 lb), last menstrual period 08/20/2023, SpO2 99%, not currently breastfeeding.  Harvey Cavazos LPN    "

## 2023-12-13 NOTE — PATIENT INSTRUCTIONS
You were seen in the ENT Clinic today by Dr. Telles. If you have any questions or concerns after your appointment, please contact us (see below)      2.   Please return to clinic as needed    3. Referral to Eye clinic    4. Tobradex ointment sent to the pharmacy        How to Contact Us:  Send a mChron message to your provider. Our team will respond to you via mChron. Occasionally, we will need to call you to get further information.  For urgent matters (Monday-Friday), call the ENT Clinic: [phone] and speak with a call center team member - they will route your call appropriately.   If you'd like to speak directly with a nurse, please find our contact information below. We do our best to check voicemail frequently throughout the day, and will work to call you back within 1-2 days. For urgent matters, please use the general clinic phone numbers listed above.      BARBARA Hollis  ENT RN Care Coordinator  Direct: [phone]    Claribel GASPAR LPN  Direct: [phone]

## 2023-12-20 NOTE — TELEPHONE ENCOUNTER
FUTURE VISIT INFORMATION      FUTURE VISIT INFORMATION:  Date: 2/27/24  Time: 7:30am  [place] [facility] [place]  REFERRAL INFORMATION:  Referring provider:  Dr. Henrique Telles   Referring providers clinic:  [facility]  Reason for visit/diagnosis  left lower eyelid stye    RECORDS REQUESTED FROM:       Clinic name Comments Records Status Imaging Status   [facility] Ov/referral 12/13/23 EPIC    Imaging MR 12/28/22 [facility] PAC

## 2024-01-05 ENCOUNTER — TELEPHONE (OUTPATIENT)
Dept: FAMILY MEDICINE | Facility: CLINIC | Age: 34
End: 2024-01-05
Payer: COMMERCIAL

## 2024-01-05 NOTE — TELEPHONE ENCOUNTER
Cody calls, she has not been able to  the Semaglutide-Weight Management (WEGOVY) 0.25 MG/0.5ML pen prescription she was prescribed back in October because every pharmacy she has checked says this medication is on back order.  She's asking if there is an alternative prescription that has a better chance of being in stock?    Please advise.  If appropriate, send new RX to [id number] - [place], [place] - [address] AT [place] [address] & [address] [phone]     You can reach Cody at [phone]    Nicole BURNETT, - Flex  Primary Care- ADS, [facility] [place] [facility]

## 2024-02-20 ENCOUNTER — TELEPHONE (OUTPATIENT)
Dept: OPHTHALMOLOGY | Facility: CLINIC | Age: 34
End: 2024-02-20
Payer: COMMERCIAL

## 2024-02-23 ENCOUNTER — TELEPHONE (OUTPATIENT)
Dept: OPHTHALMOLOGY | Facility: CLINIC | Age: 34
End: 2024-02-23
Payer: COMMERCIAL

## 2024-02-27 ENCOUNTER — OFFICE VISIT (OUTPATIENT)
Dept: OPHTHALMOLOGY | Facility: CLINIC | Age: 34
End: 2024-02-27
Payer: COMMERCIAL

## 2024-02-27 ENCOUNTER — PRE VISIT (OUTPATIENT)
Dept: OPHTHALMOLOGY | Facility: CLINIC | Age: 34
End: 2024-02-27

## 2024-02-27 DIAGNOSIS — H00.14 CHALAZION LEFT UPPER EYELID: Primary | ICD-10-CM

## 2024-02-27 PROCEDURE — 67800 REMOVE EYELID LESION: CPT | Mod: E1 | Performed by: OPHTHALMOLOGY

## 2024-02-27 PROCEDURE — 99207 PR BUNDLED PROCEDURE IN GLOBAL PKG: CPT | Performed by: OPHTHALMOLOGY

## 2024-02-27 RX ORDER — ERYTHROMYCIN 5 MG/G
OINTMENT OPHTHALMIC ONCE
Status: COMPLETED | OUTPATIENT
Start: 2024-02-27 | End: 2024-02-27

## 2024-02-27 RX ADMIN — ERYTHROMYCIN 1 G: 5 OINTMENT OPHTHALMIC at 08:25

## 2024-02-27 ASSESSMENT — VISUAL ACUITY
METHOD: SNELLEN - LINEAR
OS_SC: 20/20
OD_SC: 20/25
OS_SC+: -1

## 2024-02-27 ASSESSMENT — CONF VISUAL FIELD
OD_INFERIOR_TEMPORAL_RESTRICTION: 0
OS_SUPERIOR_TEMPORAL_RESTRICTION: 0
OD_SUPERIOR_NASAL_RESTRICTION: 0
OD_INFERIOR_NASAL_RESTRICTION: 0
OS_INFERIOR_TEMPORAL_RESTRICTION: 0
OD_NORMAL: 1
METHOD: COUNTING FINGERS
OD_SUPERIOR_TEMPORAL_RESTRICTION: 0
OS_SUPERIOR_NASAL_RESTRICTION: 0
OS_INFERIOR_NASAL_RESTRICTION: 0
OS_NORMAL: 1

## 2024-02-27 ASSESSMENT — TONOMETRY
OD_IOP_MMHG: 13
IOP_METHOD: ICARE
OS_IOP_MMHG: 10

## 2024-02-27 ASSESSMENT — SLIT LAMP EXAM - LIDS: COMMENTS: NORMAL

## 2024-02-27 NOTE — NURSING NOTE
Chief Complaints and History of Present Illnesses   Patient presents with    Consult For     Cody PARSONS Benigno is being seen for a consult today by the request of Dr. Telles due to possible Hordeolum externum of left upper lid.      Chief Complaint(s) and History of Present Illness(es)       Consult For              Laterality: left eye    Associated symptoms: eye pain and swelling    Treatments tried: no treatments    Pain scale: 8/10    Comments: Cody Pelaez is being seen for a consult today by the request of Dr. Telles due to possible Hordeolum externum of left upper lid.               Comments    This has been going on the past 3 years it gets inflamed. The lids swelling and then improves. This seem to be worsening the past few months. Today is a good day inflammation seems to be down. A month ago had a white bump on the lids that may have popped. There has some crusting discharge 2 weeks ago as well upon waking. Has tried warm compress and medication over this time and this never resolves this.   Would like to have procedure if indicated to improve this.      Loretta Liu, COT COT 7:28 AM February 27, 2024

## 2024-02-27 NOTE — PROGRESS NOTES
Chief Complaint(s) and History of Present Illness(es)     Consult For            Laterality: left eye    Associated symptoms: eye pain and swelling    Treatments tried: no treatments    Pain scale: 8/10    Comments: Cody Pelaez is being seen for a consult today by the   request of Dr. Telles due to possible Hordeolum externum of left upper   lid.           Comments    This has been going on the past 3 years it gets inflamed. The lids   swelling and then improves. This seem to be worsening the past few months.   Today is a good day inflammation seems to be down. A month ago had a white   bump on the lids that may have popped. There has some crusting discharge 2   weeks ago as well upon waking. Has tried warm compress and medication over   this time and this never resolves this.   Would like to have procedure if indicated to improve this.      SAMEER Blanton COT 7:28 AM February 27, 2024     Present x 2years  Tried warm compresses as well  prescribed drops.          Assessment & Plan     Cody Pelaez is a 33 year old female with the following diagnoses:   Encounter Diagnosis   Name Primary?    Chalazion left upper eyelid Yes     Discussed options:  1. Observation   2. Steroid injection  3. Incision and drainage    Would like to have it drained as it has been 2 years. Prefers to have it done today.    OPERATIVE NOTE: CHALAZION.    PRE-OPERATIVE DIAGNOSIS: Chalazion left upper lid.    POST-OPERATIVE DIAGNOSIS: Chalazion left upper lid.    PROCEDURE PERFORMED: Incision and drainage of chalazion left upper lid.    SURGEON: Indra Barrett    ANESTHESIA: Local infiltration with 2% lidocaine with epinephrine.    COMPLICATIONS: None    ESTIMATED BLOOD LOSS:  <5mL    HISTORY AND INDICATIONS: Patient presented with an enlarging chalazion in the involved eyelid.  This failed conservative medical management.  After the risks, benefits and alternatives of the proposed procedure were explained, informed  consent was obtained.     PROCEDURE: Patient was brought to the minor procedure room and placed supine on the operating table.  Anesthesia was as listed above.  The area was prepped and draped in the typical fashion.  A chalazion clamp was placed over the involved eyelid and the eyelid everted.  A crutiate incision was made over the chalazion and the lipogranulomatous material removed using the chalazion curette.  Scissors were used to break any septae.  The edges of the cruciate incision were excised using Deangelo scissors.  Hemostasis was obtained.  The lid was reverted to its normal position, the clamp removed, and the erythromycin antibiotic ointment applied.  The patient tolerated the procedure well and left in stable condition with a tube of antibiotic ointment.     I was present for the entire procedure. Indra Barrett MD            Attending Physician Attestation: Complete documentation of historical and exam elements from today's encounter can be found in the full encounter summary report (not reduplicated in this progress note). I personally obtained the chief complaint(s) and history of present illness. I confirmed and edited as necessary the review of systems, past medical/surgical history, family history, social history, and examination findings as documented by others; and I examined the patient myself. I personally reviewed the relevant tests, images, and reports as documented above. I formulated and edited as necessary the assessment and plan and discussed the findings and management plan with the patient.  -Indra Barrett MD

## 2024-03-28 DIAGNOSIS — E66.3 OVERWEIGHT (BMI 25.0-29.9): ICD-10-CM

## 2024-03-28 NOTE — TELEPHONE ENCOUNTER
"\"And I ve been taking it every Tuesday in the mornings       Cody ISSA Pelaez  You9 minutes ago (4:09 PM)     jerry it two weeks ago everywhere that I called was in back order. I have two doses left.       You  Cody ISSA [id number] minutes ago (4:08 PM)     CT  Danny Ross,     Looks like you received a months worth of the low dose Wegovy in October.     Did you ever start this medication? When was your last dose?      Raina John RN\"      Advised follow up visit after completing doses.    Raina John RN    "

## 2024-03-29 ENCOUNTER — MYC MEDICAL ADVICE (OUTPATIENT)
Dept: FAMILY MEDICINE | Facility: CLINIC | Age: 34
End: 2024-03-29
Payer: COMMERCIAL

## 2024-04-11 ENCOUNTER — VIRTUAL VISIT (OUTPATIENT)
Dept: FAMILY MEDICINE | Facility: CLINIC | Age: 34
End: 2024-04-11
Payer: COMMERCIAL

## 2024-04-11 DIAGNOSIS — E66.09 CLASS 1 OBESITY DUE TO EXCESS CALORIES WITHOUT SERIOUS COMORBIDITY WITH BODY MASS INDEX (BMI) OF 30.0 TO 30.9 IN ADULT: Primary | ICD-10-CM

## 2024-04-11 DIAGNOSIS — E66.811 CLASS 1 OBESITY DUE TO EXCESS CALORIES WITHOUT SERIOUS COMORBIDITY WITH BODY MASS INDEX (BMI) OF 30.0 TO 30.9 IN ADULT: Primary | ICD-10-CM

## 2024-04-11 PROCEDURE — 99213 OFFICE O/P EST LOW 20 MIN: CPT | Mod: 95 | Performed by: GENERAL PRACTICE

## 2024-04-11 NOTE — PROGRESS NOTES
"Cody is a 33 year old who is being evaluated via a billable video visit.    How would you like to obtain your AVS? MyChart  If the video visit is dropped, the invitation should be resent by: Text to cell phone: [phone]  Will anyone else be joining your video visit? No      Assessment & Plan     Class 1 obesity due to excess calories without serious comorbidity with body mass index (BMI) of 30.0 to 30.9 in adult  Ok to increase the dose, can discuss next if having supply issue about short-term phentermine  - Semaglutide-Weight Management (WEGOVY) 1 MG/0.5ML pen; Inject 1 mg Subcutaneous once a week  - Semaglutide-Weight Management (WEGOVY) 0.5 MG/0.5ML pen; Inject 0.5 mg Subcutaneous once a week            BMI  Estimated body mass index is 30.65 kg/m  as calculated from the following:    Height as of 12/13/23: 1.6 m (5' 3\").    Weight as of 12/13/23: 78.5 kg (173 lb).             Subjective   Cody is a 33 year old, presenting for the following health issues:  Recheck Medication        4/11/2024     3:23 PM   Additional Questions   Roomed by Nicole Moses VF     Video Start Time:  3:30 pm      Would like to follow up on new weight loss medication.       Medication Check    Tolerating the lowest dose of wegovy.  Lost a few pounds and gained it back.         History of Present Illness       Reason for visit:  Cause i have a appointment    She eats 2-3 servings of fruits and vegetables daily.She consumes 3 sweetened beverage(s) daily.She exercises with enough effort to increase her heart rate 20 to 29 minutes per day.  She exercises with enough effort to increase her heart rate 3 or less days per week.   She is taking medications regularly.               Review of Systems  Constitutional, HEENT, cardiovascular, pulmonary, gi and gu systems are negative, except as otherwise noted.      Objective           Vitals:  No vitals were obtained today due to virtual visit.    Physical Exam   GENERAL: alert and no " distress  EYES: Eyes grossly normal to inspection.  No discharge or erythema, or obvious scleral/conjunctival abnormalities.  RESP: No audible wheeze, cough, or visible cyanosis.    SKIN: Visible skin clear. No significant rash, abnormal pigmentation or lesions.  NEURO: Cranial nerves grossly intact.  Mentation and speech appropriate for age.  PSYCH: Appropriate affect, tone, and pace of words          Video-Visit Details    Type of service:  Video Visit   Video End Time: 03:40  Originating Location (pt. Location): Home    Distant Location (provider location):  On-site  Platform used for Video Visit: Other: Haiku  Signed Electronically by: Aaliyah Joe MD

## 2024-05-08 ENCOUNTER — VIRTUAL VISIT (OUTPATIENT)
Dept: FAMILY MEDICINE | Facility: CLINIC | Age: 34
End: 2024-05-08
Payer: COMMERCIAL

## 2024-05-08 DIAGNOSIS — E66.811 CLASS 1 OBESITY DUE TO EXCESS CALORIES WITHOUT SERIOUS COMORBIDITY WITH BODY MASS INDEX (BMI) OF 30.0 TO 30.9 IN ADULT: Primary | ICD-10-CM

## 2024-05-08 DIAGNOSIS — E66.09 CLASS 1 OBESITY DUE TO EXCESS CALORIES WITHOUT SERIOUS COMORBIDITY WITH BODY MASS INDEX (BMI) OF 30.0 TO 30.9 IN ADULT: Primary | ICD-10-CM

## 2024-05-08 PROCEDURE — 99213 OFFICE O/P EST LOW 20 MIN: CPT | Mod: 95 | Performed by: GENERAL PRACTICE

## 2024-05-08 NOTE — PROGRESS NOTES
"Cody is a 33 year old who is being evaluated via a billable video visit.    How would you like to obtain your AVS? MyChart  If the video visit is dropped, the invitation should be resent by: Text to cell phone: [phone]  Will anyone else be joining your video visit? No      Assessment & Plan     Class 1 obesity due to excess calories without serious comorbidity with body mass index (BMI) of 30.0 to 30.9 in adult  Discussed with patient the prescription was sent on 4/11  Can go up to 1.7 mg next if tolerated the 1 mg   - Semaglutide-Weight Management (WEGOVY) 1.7 MG/0.75ML pen; Inject 1.7 mg Subcutaneous once a week            BMI  Estimated body mass index is 30.65 kg/m  as calculated from the following:    Height as of 12/13/23: 1.6 m (5' 3\").    Weight as of 12/13/23: 78.5 kg (173 lb).             Subjective   Cody is a 33 year old, presenting for the following health issues:  Recheck Medication (Weight )        5/8/2024     3:12 PM   Additional Questions   Roomed by Mouna HICKS MA   Accompanied by self         5/8/2024     3:12 PM   Patient Reported Additional Medications   Patient reports taking the following new medications none     Video Start Time:  3:35 PM      Refill    Sent wegovy 0.5 and 1 mg on 4/11.    Asked patent to follow-up with pharmacy.   Sent Rx for 1.7 mg today for 6/11    History of Present Illness       Reason for visit:  Just need a refill    She eats 2-3 servings of fruits and vegetables daily.She consumes 3 sweetened beverage(s) daily.She exercises with enough effort to increase her heart rate 30 to 60 minutes per day.  She exercises with enough effort to increase her heart rate 4 days per week.   She is taking medications regularly.       Medication Followup of Wegovy   Taking Medication as prescribed: yes  Side Effects:  None  Medication Helping Symptoms:  yes          Review of Systems  Constitutional, HEENT, cardiovascular, pulmonary, gi and gu systems are negative, except as " otherwise noted.      Objective    Vitals - Patient Reported  Weight (Patient Reported): 74.4 kg (164 lb)      Vitals:  No vitals were obtained today due to virtual visit.    Physical Exam   GENERAL: alert and no distress  EYES: Eyes grossly normal to inspection.  No discharge or erythema, or obvious scleral/conjunctival abnormalities.  RESP: No audible wheeze, cough, or visible cyanosis.    SKIN: Visible skin clear. No significant rash, abnormal pigmentation or lesions.  NEURO: Cranial nerves grossly intact.  Mentation and speech appropriate for age.  PSYCH: Appropriate affect, tone, and pace of words          Video-Visit Details    Type of service:  Video Visit   Video End Time: 3:45  Originating Location (pt. Location): Home    Distant Location (provider location):  On-site  Platform used for Video Visit: Other: Haiku  Signed Electronically by: Aaliyah Joe MD

## 2024-05-31 ENCOUNTER — TELEPHONE (OUTPATIENT)
Dept: FAMILY MEDICINE | Facility: CLINIC | Age: 34
End: 2024-05-31
Payer: COMMERCIAL

## 2024-05-31 DIAGNOSIS — E66.811 CLASS 1 OBESITY DUE TO EXCESS CALORIES WITHOUT SERIOUS COMORBIDITY WITH BODY MASS INDEX (BMI) OF 30.0 TO 30.9 IN ADULT: Primary | ICD-10-CM

## 2024-05-31 DIAGNOSIS — E66.09 CLASS 1 OBESITY DUE TO EXCESS CALORIES WITHOUT SERIOUS COMORBIDITY WITH BODY MASS INDEX (BMI) OF 30.0 TO 30.9 IN ADULT: Primary | ICD-10-CM

## 2024-05-31 NOTE — TELEPHONE ENCOUNTER
Pt calls,     S-(situation): leaving out of town 6/5/24 and won't be back until 6/15/24    B-(background): wants to  Wegovy now before leaving and get early refill, rx dated 6/11/24, pharmacy needs provider approval    A-(assessment): early refill request    R-(recommendations): routed to PCP, please confirm early refill for Wegovy dated 6/11/24, inform pt and pharmacy when final    Telephone Information:   [facility] [phone]   [facility] [phone]     Lennie Whipple, RN, BSN  [facility]

## 2024-05-31 NOTE — TELEPHONE ENCOUNTER
Aaliyah Joe MD   Patient needs a refill of the next dose and asking to pick that up early     RN placed call to pharmacy   They do not have any refills on file to  early   Picked up the 1.7 mg dose on 5/10/2024 (RN sees this on epic list to be start date of 6/11/2024, pharmacy did not see this start date and has already been picked up)   Pharmacy states need next dose and to state okay to pick this up early     Sandy Shelton Registered Nurse  [facility]

## 2024-05-31 NOTE — TELEPHONE ENCOUNTER
Aaliyah Jeo MD Vogelgesang, Mary, RN; [facility]27 minutes ago (1:45 PM)     EH  Ok for early refill

## 2024-07-03 DIAGNOSIS — E66.09 CLASS 1 OBESITY DUE TO EXCESS CALORIES WITHOUT SERIOUS COMORBIDITY WITH BODY MASS INDEX (BMI) OF 30.0 TO 30.9 IN ADULT: ICD-10-CM

## 2024-07-03 DIAGNOSIS — E66.811 CLASS 1 OBESITY DUE TO EXCESS CALORIES WITHOUT SERIOUS COMORBIDITY WITH BODY MASS INDEX (BMI) OF 30.0 TO 30.9 IN ADULT: ICD-10-CM

## 2024-07-25 ENCOUNTER — NURSE TRIAGE (OUTPATIENT)
Dept: FAMILY MEDICINE | Facility: CLINIC | Age: 34
End: 2024-07-25
Payer: COMMERCIAL

## 2024-07-25 NOTE — TELEPHONE ENCOUNTER
Reason for Call:  Appointment Request    Patient requesting this type of appt:  was bit by 2 ticks earlier this summer. Is now very tired 24/7 and looking pale    Requested provider: Aaliyah Joe, willing to see other providers as well    Reason patient unable to be scheduled: Not within requested timeframe    When does patient want to be seen/preferred time: 3-7 days    Comments: none    Could we send this information to you in [employer] or would you prefer to receive a phone call?:   No preference   Okay to leave a detailed message?: Yes at Cell number on file:    Telephone Information:   Mobile [phone]   Mobile [phone]       Call taken on 7/25/2024 at 1:41 PM by Susan Grier

## 2024-07-25 NOTE — TELEPHONE ENCOUNTER
"S: fatigue  B: two separate tick bites. April 2024 & June 2024. First tick was in her hair and was fully engorged. Second tick was a \"tiny red one\" found on back of neck. Does not think she got the head out.  A: symptoms began around mid June after tick removal. Occasional lightheadedness.    Denies: fever, rash, redness at bite area, red ring around bite,     R: appointment made in 3 days, 7/29/24    Jess Sena RN        Additional Information   Negative: Patient sounds very sick or weak to the triager   Negative: Fever or severe headache occurs, 2 to 14 days following the bite   Negative: Widespread rash occurs, 2 to 14 days following the bite   Negative: Can't remove live tick (after using Care Advice)   Negative: Fever and spreading red area or streak   Negative: Fever and area is very tender to touch   Negative: Red streak or red line and length > 2 inches (5 cm)   Negative: Red or very tender (to touch) area and started over 24 hours after the bite   Negative: Red ring or bull's-eye rash occurs around a deer tick bite   Negative: Probable deer tick that was attached > 36 hours (or tick appears swollen, not flat)    Protocols used: Tick Bite-A-OH    "

## 2024-07-29 ENCOUNTER — OFFICE VISIT (OUTPATIENT)
Dept: FAMILY MEDICINE | Facility: CLINIC | Age: 34
End: 2024-07-29
Payer: COMMERCIAL

## 2024-07-29 ENCOUNTER — ANCILLARY PROCEDURE (OUTPATIENT)
Dept: GENERAL RADIOLOGY | Facility: CLINIC | Age: 34
End: 2024-07-29
Attending: PHYSICIAN ASSISTANT
Payer: COMMERCIAL

## 2024-07-29 VITALS
WEIGHT: 149.6 LBS | OXYGEN SATURATION: 99 % | DIASTOLIC BLOOD PRESSURE: 61 MMHG | RESPIRATION RATE: 16 BRPM | HEIGHT: 64 IN | SYSTOLIC BLOOD PRESSURE: 91 MMHG | HEART RATE: 73 BPM | TEMPERATURE: 97.6 F | BODY MASS INDEX: 25.54 KG/M2

## 2024-07-29 DIAGNOSIS — M53.3 COCCYDYNIA: ICD-10-CM

## 2024-07-29 DIAGNOSIS — R53.83 OTHER FATIGUE: ICD-10-CM

## 2024-07-29 DIAGNOSIS — S00.06XA TICK BITE OF SCALP, INITIAL ENCOUNTER: Primary | ICD-10-CM
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
## 2024-07-29 LAB
ERYTHROCYTE [DISTWIDTH] IN BLOOD BY AUTOMATED COUNT: 13 % (ref 10–15)
FERRITIN SERPL-MCNC: 211 NG/ML (ref 6–175)
HCT VFR BLD AUTO: 35 % (ref 35–47)
HGB BLD-MCNC: 11.8 G/DL (ref 11.7–15.7)
IRON BINDING CAPACITY (ROCHE): 216 UG/DL (ref 240–430)
IRON SATN MFR SERPL: 37 % (ref 15–46)
IRON SERPL-MCNC: 79 UG/DL (ref 37–145)
MCH RBC QN AUTO: 29.2 PG (ref 26.5–33)
MCHC RBC AUTO-ENTMCNC: 33.7 G/DL (ref 31.5–36.5)
MCV RBC AUTO: 87 FL (ref 78–100)
PLATELET # BLD AUTO: 263 10E3/UL (ref 150–450)
RBC # BLD AUTO: 4.04 10E6/UL (ref 3.8–5.2)
TSH SERPL DL<=0.005 MIU/L-ACNC: 1.38 UIU/ML (ref 0.3–4.2)
WBC # BLD AUTO: 5.4 10E3/UL (ref 4–11)

## 2024-07-29 PROCEDURE — 82728 ASSAY OF FERRITIN: CPT | Performed by: PHYSICIAN ASSISTANT

## 2024-07-29 PROCEDURE — 83550 IRON BINDING TEST: CPT | Performed by: PHYSICIAN ASSISTANT

## 2024-07-29 PROCEDURE — 84443 ASSAY THYROID STIM HORMONE: CPT | Performed by: PHYSICIAN ASSISTANT

## 2024-07-29 PROCEDURE — 85027 COMPLETE CBC AUTOMATED: CPT | Performed by: PHYSICIAN ASSISTANT

## 2024-07-29 PROCEDURE — 83540 ASSAY OF IRON: CPT | Performed by: PHYSICIAN ASSISTANT

## 2024-07-29 PROCEDURE — 72220 X-RAY EXAM SACRUM TAILBONE: CPT | Mod: TC | Performed by: RADIOLOGY

## 2024-07-29 PROCEDURE — 99214 OFFICE O/P EST MOD 30 MIN: CPT | Performed by: PHYSICIAN ASSISTANT

## 2024-07-29 PROCEDURE — 86618 LYME DISEASE ANTIBODY: CPT | Performed by: PHYSICIAN ASSISTANT

## 2024-07-29 PROCEDURE — 36415 COLL VENOUS BLD VENIPUNCTURE: CPT | Performed by: PHYSICIAN ASSISTANT

## 2024-07-29 ASSESSMENT — PAIN SCALES - GENERAL: PAINLEVEL: NO PAIN (0)

## 2024-07-29 NOTE — PROGRESS NOTES
"  Assessment & Plan     Tick bite of scalp, initial encounter  Other fatigue  Im thinking this is likely more related to her wegovy as the timeline checks out but with her tick exposure we're screening below. Thyroid and iron studies to broaden ddx  - LYME DISEASE TOTAL ANTIBODIES WITH REFLEX TO CONFIRMATION; Future  - Iron and iron binding capacity; Future  - TSH with free T4 reflex; Future  - Ferritin; Future  - CBC with platelets; Future    Coccydynia  Will assess structure. I think likely she'll benefit from physical therapy.. follow up per radiology read  - XR Sacrum and Coccyx 2 Views; Future          BMI  Estimated body mass index is 25.68 kg/m  as calculated from the following:    Height as of this encounter: 1.626 m (5' 4\").    Weight as of this encounter: 67.9 kg (149 lb 9.6 oz).         Edmundo Ross is a 33 year old, presenting for the following health issues:  Insect Bites        7/29/2024     9:20 AM   Additional Questions   Roomed by Shelly OLGUIN CMA   Accompanied by ELVIA         7/29/2024     9:20 AM   Patient Reported Additional Medications   Patient reports taking the following new medications None     History of Present Illness       Reason for visit:  Ticks    She eats 2-3 servings of fruits and vegetables daily.She consumes 2 sweetened beverage(s) daily.She exercises with enough effort to increase her heart rate 20 to 29 minutes per day.  She exercises with enough effort to increase her heart rate 4 days per week.   She is taking medications regularly.     Patient states that she had two ticks on her in the last two months the most recent was a deer tick.  She was recently traveling to both [place] and [place] and after those trips she found ticks  Did try to remove on her own  Denies any rashes following but has been feeling more tired than usual  No fever, chills or sweats (has long history of night sweats, not new)      Patient also states that she fell and landed on her tail bone around two " "or more years ago as does not feel like it has fully healed.   Hurts with squats, sitting in the bathtub, hard to ride on a back      Review of Systems  Constitutional, HEENT, cardiovascular, pulmonary, gi and gu systems are negative, except as otherwise noted.      Objective    BP 91/61 (BP Location: Right arm, Patient Position: Sitting, Cuff Size: Adult Regular)   Pulse 73   Temp 97.6  F (36.4  C) (Oral)   Resp 16   Ht 1.626 m (5' 4\")   Wt 67.9 kg (149 lb 9.6 oz)   LMP 07/17/2024 (Exact Date)   SpO2 99%   BMI 25.68 kg/m    Body mass index is 25.68 kg/m .  Physical Exam   GENERAL: alert and no distress  EYES: Eyes grossly normal to inspection, PERRL and conjunctivae and sclerae normal  HENT: ear canals and TM's normal, nose and mouth without ulcers or lesions  NECK: no adenopathy and thyroid normal to palpation  RESP: lungs clear to auscultation - no rales, rhonchi or wheezes  CV: regular rate and rhythm, normal S1 S2, no S3 or S4, no murmur, click or rub, no peripheral edema  ABDOMEN: soft, nontender, no hepatosplenomegaly, no masses and bowel sounds normal  BACK: sacrum not evaluated  LYMPH: no cervical, supraclavicular, axillary            Signed Electronically by: Dylon Campbell PA-C    "

## 2024-07-30 LAB — B BURGDOR IGG+IGM SER QL: 0.41

## 2024-09-03 ENCOUNTER — PATIENT OUTREACH (OUTPATIENT)
Dept: CARE COORDINATION | Facility: CLINIC | Age: 34
End: 2024-09-03
Payer: COMMERCIAL

## 2024-10-04 ENCOUNTER — OFFICE VISIT (OUTPATIENT)
Dept: FAMILY MEDICINE | Facility: CLINIC | Age: 34
End: 2024-10-04
Payer: COMMERCIAL

## 2024-10-04 VITALS
BODY MASS INDEX: 23.56 KG/M2 | HEART RATE: 82 BPM | SYSTOLIC BLOOD PRESSURE: 89 MMHG | DIASTOLIC BLOOD PRESSURE: 58 MMHG | TEMPERATURE: 97.3 F | OXYGEN SATURATION: 98 % | RESPIRATION RATE: 16 BRPM | WEIGHT: 138 LBS | HEIGHT: 64 IN

## 2024-10-04 DIAGNOSIS — E66.811 CLASS 1 OBESITY DUE TO EXCESS CALORIES WITHOUT SERIOUS COMORBIDITY WITH BODY MASS INDEX (BMI) OF 30.0 TO 30.9 IN ADULT: ICD-10-CM

## 2024-10-04 DIAGNOSIS — Z71.84 COUNSELING ABOUT TRAVEL: ICD-10-CM

## 2024-10-04 DIAGNOSIS — E66.09 CLASS 1 OBESITY DUE TO EXCESS CALORIES WITHOUT SERIOUS COMORBIDITY WITH BODY MASS INDEX (BMI) OF 30.0 TO 30.9 IN ADULT: ICD-10-CM

## 2024-10-04 DIAGNOSIS — Z00.00 ROUTINE GENERAL MEDICAL EXAMINATION AT A HEALTH CARE FACILITY: Primary | ICD-10-CM

## 2024-10-04 PROCEDURE — 36415 COLL VENOUS BLD VENIPUNCTURE: CPT | Performed by: GENERAL PRACTICE

## 2024-10-04 PROCEDURE — 87491 CHLMYD TRACH DNA AMP PROBE: CPT | Performed by: GENERAL PRACTICE

## 2024-10-04 PROCEDURE — 87591 N.GONORRHOEAE DNA AMP PROB: CPT | Performed by: GENERAL PRACTICE

## 2024-10-04 PROCEDURE — 99395 PREV VISIT EST AGE 18-39: CPT | Mod: 25 | Performed by: GENERAL PRACTICE

## 2024-10-04 PROCEDURE — 90471 IMMUNIZATION ADMIN: CPT | Performed by: GENERAL PRACTICE

## 2024-10-04 PROCEDURE — 80053 COMPREHEN METABOLIC PANEL: CPT | Performed by: GENERAL PRACTICE

## 2024-10-04 PROCEDURE — 99213 OFFICE O/P EST LOW 20 MIN: CPT | Mod: 25 | Performed by: GENERAL PRACTICE

## 2024-10-04 PROCEDURE — 86803 HEPATITIS C AB TEST: CPT | Performed by: GENERAL PRACTICE

## 2024-10-04 PROCEDURE — 90656 IIV3 VACC NO PRSV 0.5 ML IM: CPT | Performed by: GENERAL PRACTICE

## 2024-10-04 PROCEDURE — 87389 HIV-1 AG W/HIV-1&-2 AB AG IA: CPT | Performed by: GENERAL PRACTICE

## 2024-10-04 PROCEDURE — 82533 TOTAL CORTISOL: CPT | Performed by: GENERAL PRACTICE

## 2024-10-04 PROCEDURE — 90472 IMMUNIZATION ADMIN EACH ADD: CPT | Performed by: GENERAL PRACTICE

## 2024-10-04 PROCEDURE — 90632 HEPA VACCINE ADULT IM: CPT | Performed by: GENERAL PRACTICE

## 2024-10-04 SDOH — HEALTH STABILITY: PHYSICAL HEALTH: ON AVERAGE, HOW MANY DAYS PER WEEK DO YOU ENGAGE IN MODERATE TO STRENUOUS EXERCISE (LIKE A BRISK WALK)?: 4 DAYS

## 2024-10-04 SDOH — HEALTH STABILITY: PHYSICAL HEALTH: ON AVERAGE, HOW MANY MINUTES DO YOU ENGAGE IN EXERCISE AT THIS LEVEL?: 30 MIN

## 2024-10-04 ASSESSMENT — SOCIAL DETERMINANTS OF HEALTH (SDOH): HOW OFTEN DO YOU GET TOGETHER WITH FRIENDS OR RELATIVES?: PATIENT DECLINED

## 2024-10-04 ASSESSMENT — PAIN SCALES - GENERAL: PAINLEVEL: NO PAIN (0)

## 2024-10-04 NOTE — PATIENT INSTRUCTIONS
Patient Education   Preventive Care Advice   This is general advice given by our system to help you stay healthy. However, your care team may have specific advice just for you. Please talk to your care team about your preventive care needs.  Nutrition  Eat 5 or more servings of fruits and vegetables each day.  Try wheat bread, brown rice and whole grain pasta (instead of white bread, rice, and pasta).  Get enough calcium and vitamin D. Check the label on foods and aim for 100% of the RDA (recommended daily allowance).  Lifestyle  Exercise at least 150 minutes each week  (30 minutes a day, 5 days a week).  Do muscle strengthening activities 2 days a week. These help control your weight and prevent disease.  No smoking.  Wear sunscreen to prevent skin cancer.  Have a dental exam and cleaning every 6 months.  Yearly exams  See your health care team every year to talk about:  Any changes in your health.  Any medicines your care team has prescribed.  Preventive care, family planning, and ways to prevent chronic diseases.  Shots (vaccines)   HPV shots (up to age 26), if you've never had them before.  Hepatitis B shots (up to age 59), if you've never had them before.  COVID-19 shot: Get this shot when it's due.  Flu shot: Get a flu shot every year.  Tetanus shot: Get a tetanus shot every 10 years.  Pneumococcal, hepatitis A, and RSV shots: Ask your care team if you need these based on your risk.  Shingles shot (for age 50 and up)  General health tests  Diabetes screening:  Starting at age 35, Get screened for diabetes at least every 3 years.  If you are younger than age 35, ask your care team if you should be screened for diabetes.  Cholesterol test: At age 39, start having a cholesterol test every 5 years, or more often if advised.  Bone density scan (DEXA): At age 50, ask your care team if you should have this scan for osteoporosis (brittle bones).  Hepatitis C: Get tested at least once in your life.  STIs (sexually  transmitted infections)  Before age 24: Ask your care team if you should be screened for STIs.  After age 24: Get screened for STIs if you're at risk. You are at risk for STIs (including HIV) if:  You are sexually active with more than one person.  You don't use condoms every time.  You or a partner was diagnosed with a sexually transmitted infection.  If you are at risk for HIV, ask about PrEP medicine to prevent HIV.  Get tested for HIV at least once in your life, whether you are at risk for HIV or not.  Cancer screening tests  Cervical cancer screening: If you have a cervix, begin getting regular cervical cancer screening tests starting at age 21.  Breast cancer scan (mammogram): If you've ever had breasts, begin having regular mammograms starting at age 40. This is a scan to check for breast cancer.  Colon cancer screening: It is important to start screening for colon cancer at age 45.  Have a colonoscopy test every 10 years (or more often if you're at risk) Or, ask your provider about stool tests like a FIT test every year or Cologuard test every 3 years.  To learn more about your testing options, visit:   .  For help making a decision, visit:   https://bit.ly/kz39005.  Prostate cancer screening test: If you have a prostate, ask your care team if a prostate cancer screening test (PSA) at age 55 is right for you.  Lung cancer screening: If you are a current or former smoker ages 50 to 80, ask your care team if ongoing lung cancer screenings are right for you.  For informational purposes only. Not to replace the advice of your health care provider. Copyright   2023 [employer]. All rights reserved. Clinically reviewed by the [facility]itions [facility]. [place] 440039 - REV 01/24.

## 2024-10-05 LAB
ALBUMIN SERPL BCG-MCNC: 4.3 G/DL (ref 3.5–5.2)
ALP SERPL-CCNC: 44 U/L (ref 40–150)
ALT SERPL W P-5'-P-CCNC: 14 U/L (ref 0–50)
ANION GAP SERPL CALCULATED.3IONS-SCNC: 9 MMOL/L (ref 7–15)
AST SERPL W P-5'-P-CCNC: 19 U/L (ref 0–45)
BILIRUB SERPL-MCNC: 0.7 MG/DL
BUN SERPL-MCNC: 12.2 MG/DL (ref 6–20)
C TRACH DNA SPEC QL PROBE+SIG AMP: NEGATIVE
CALCIUM SERPL-MCNC: 9.2 MG/DL (ref 8.8–10.4)
CHLORIDE SERPL-SCNC: 104 MMOL/L (ref 98–107)
CORTIS SERPL-MCNC: 6.6 UG/DL
CREAT SERPL-MCNC: 0.64 MG/DL (ref 0.51–0.95)
EGFRCR SERPLBLD CKD-EPI 2021: >90 ML/MIN/1.73M2
GLUCOSE SERPL-MCNC: 77 MG/DL (ref 70–99)
HCO3 SERPL-SCNC: 25 MMOL/L (ref 22–29)
HCV AB SERPL QL IA: NONREACTIVE
HIV 1+2 AB+HIV1 P24 AG SERPL QL IA: NONREACTIVE
N GONORRHOEA DNA SPEC QL NAA+PROBE: NEGATIVE
POTASSIUM SERPL-SCNC: 3.8 MMOL/L (ref 3.4–5.3)
PROT SERPL-MCNC: 7.7 G/DL (ref 6.4–8.3)
SODIUM SERPL-SCNC: 138 MMOL/L (ref 135–145)

## 2025-03-03 DIAGNOSIS — E66.811 CLASS 1 OBESITY DUE TO EXCESS CALORIES WITHOUT SERIOUS COMORBIDITY WITH BODY MASS INDEX (BMI) OF 30.0 TO 30.9 IN ADULT: ICD-10-CM

## 2025-03-03 DIAGNOSIS — E66.09 CLASS 1 OBESITY DUE TO EXCESS CALORIES WITHOUT SERIOUS COMORBIDITY WITH BODY MASS INDEX (BMI) OF 30.0 TO 30.9 IN ADULT: ICD-10-CM

## 2025-03-03 RX ORDER — SEMAGLUTIDE 2.4 MG/.75ML
INJECTION, SOLUTION SUBCUTANEOUS
Qty: 3 ML | Refills: 0 | Status: SHIPPED | OUTPATIENT
Start: 2025-03-03

## 2025-04-07 DIAGNOSIS — E66.09 CLASS 1 OBESITY DUE TO EXCESS CALORIES WITHOUT SERIOUS COMORBIDITY WITH BODY MASS INDEX (BMI) OF 30.0 TO 30.9 IN ADULT: ICD-10-CM

## 2025-04-07 DIAGNOSIS — E66.811 CLASS 1 OBESITY DUE TO EXCESS CALORIES WITHOUT SERIOUS COMORBIDITY WITH BODY MASS INDEX (BMI) OF 30.0 TO 30.9 IN ADULT: ICD-10-CM

## 2025-04-07 NOTE — TELEPHONE ENCOUNTER
Patient also calling for prescription to get this refilled.  Please fill as appropriate.  Thank you    Katia Retana RN BSN  Clinic Nurse  [facility]

## 2025-04-08 RX ORDER — SEMAGLUTIDE 2.4 MG/.75ML
2.4 INJECTION, SOLUTION SUBCUTANEOUS WEEKLY
Qty: 3 ML | Refills: 0 | Status: SHIPPED | OUTPATIENT
Start: 2025-04-08

## 2025-04-15 ENCOUNTER — VIRTUAL VISIT (OUTPATIENT)
Dept: FAMILY MEDICINE | Facility: CLINIC | Age: 35
End: 2025-04-15
Payer: COMMERCIAL

## 2025-04-15 DIAGNOSIS — E66.09 CLASS 1 OBESITY DUE TO EXCESS CALORIES WITHOUT SERIOUS COMORBIDITY WITH BODY MASS INDEX (BMI) OF 30.0 TO 30.9 IN ADULT: ICD-10-CM

## 2025-04-15 DIAGNOSIS — E66.811 CLASS 1 OBESITY DUE TO EXCESS CALORIES WITHOUT SERIOUS COMORBIDITY WITH BODY MASS INDEX (BMI) OF 30.0 TO 30.9 IN ADULT: ICD-10-CM

## 2025-04-15 DIAGNOSIS — Z76.89 ENCOUNTER FOR WEIGHT MANAGEMENT: Primary | ICD-10-CM

## 2025-04-15 PROCEDURE — 98004 SYNCH AUDIO-VIDEO EST SF 10: CPT | Performed by: GENERAL PRACTICE

## 2025-04-15 RX ORDER — SEMAGLUTIDE 2.4 MG/.75ML
2.4 INJECTION, SOLUTION SUBCUTANEOUS WEEKLY
Qty: 3 ML | Refills: 3 | Status: SHIPPED | OUTPATIENT
Start: 2025-04-15

## 2025-04-15 NOTE — PROGRESS NOTES
Cody is a 34 year old who is being evaluated via a billable video visit.    How would you like to obtain your AVS? MyChart  If the video visit is dropped, the invitation should be resent by: Text to cell phone: [phone]  Will anyone else be joining your video visit? No      Assessment & Plan     Encounter for weight management  Tolerating wegovy 2.4 mg  Weight is in healthy range  Reach out if further weight loss                Subjective   Cody is a 34 year old, presenting for the following health issues:  Recheck Medication (Wegovy follow up)        4/15/2025    11:47 AM   Additional Questions   Roomed by Nieves SKELTON     Video Start Time:  12:12 PM      Follow-up     ~129 lbs   Tolerating current medication.     History of Present Illness       Reason for visit:  Follow -up on Wegovy    She eats 2-3 servings of fruits and vegetables daily.She consumes 0 sweetened beverage(s) daily.She exercises with enough effort to increase her heart rate 30 to 60 minutes per day.  She exercises with enough effort to increase her heart rate 5 days per week.   She is taking medications regularly.          Medication Followup of Wegovy  Taking Medication as prescribed: yes  Side Effects:  None  Medication Helping Symptoms:  yes      Review of Systems  Constitutional, HEENT, cardiovascular, pulmonary, gi and gu systems are negative, except as otherwise noted.      Objective    Vitals - Patient Reported  Weight (Patient Reported): 58.5 kg (129 lb)  Pain Score: No Pain (0)        Physical Exam   GENERAL: alert and no distress  EYES: Eyes grossly normal to inspection.  No discharge or erythema, or obvious scleral/conjunctival abnormalities.  RESP: No audible wheeze, cough, or visible cyanosis.    SKIN: Visible skin clear. No significant rash, abnormal pigmentation or lesions.  NEURO: Cranial nerves grossly intact.  Mentation and speech appropriate for age.  PSYCH: Appropriate affect, tone, and pace of words          Video-Visit  Details    Type of service:  Video Visit   Video End Time: 12:22 PM   Originating Location (pt. Location): Home    Distant Location (provider location):  On-site  Platform used for Video Visit: Other: Haiku  Signed Electronically by: Aaliyah Joe MD

## (undated) RX ORDER — ERYTHROMYCIN 5 MG/G
OINTMENT OPHTHALMIC
Status: DISPENSED
Start: 2024-02-27